# Patient Record
Sex: FEMALE | Race: WHITE | NOT HISPANIC OR LATINO | Employment: FULL TIME | URBAN - METROPOLITAN AREA
[De-identification: names, ages, dates, MRNs, and addresses within clinical notes are randomized per-mention and may not be internally consistent; named-entity substitution may affect disease eponyms.]

---

## 2017-04-26 ENCOUNTER — OFFICE VISIT (OUTPATIENT)
Dept: RETAIL CLINIC | Facility: CLINIC | Age: 37
End: 2017-04-26

## 2017-04-26 VITALS
SYSTOLIC BLOOD PRESSURE: 135 MMHG | OXYGEN SATURATION: 96 % | WEIGHT: 225 LBS | HEIGHT: 63 IN | HEART RATE: 86 BPM | BODY MASS INDEX: 39.87 KG/M2 | TEMPERATURE: 98.2 F | DIASTOLIC BLOOD PRESSURE: 75 MMHG

## 2017-04-26 DIAGNOSIS — N30.00 ACUTE CYSTITIS WITHOUT HEMATURIA: ICD-10-CM

## 2017-04-26 DIAGNOSIS — R35.0 FREQUENT URINATION: Primary | ICD-10-CM

## 2017-04-26 DIAGNOSIS — J30.2 SEASONAL ALLERGIC RHINITIS, UNSPECIFIED ALLERGIC RHINITIS TRIGGER: ICD-10-CM

## 2017-04-26 LAB
BILIRUB BLD-MCNC: NEGATIVE MG/DL
CLARITY, POC: CLEAR
COLOR UR: ABNORMAL
GLUCOSE UR STRIP-MCNC: ABNORMAL MG/DL
KETONES UR QL: NEGATIVE
LEUKOCYTE EST, POC: NEGATIVE
NITRITE UR-MCNC: POSITIVE MG/ML
PH UR: 5.5 [PH] (ref 5–8)
PROT UR STRIP-MCNC: NEGATIVE MG/DL
RBC # UR STRIP: ABNORMAL /UL
SP GR UR: 1 (ref 1–1.03)
UROBILINOGEN UR QL: NORMAL

## 2017-04-26 PROCEDURE — 81003 URINALYSIS AUTO W/O SCOPE: CPT | Performed by: NURSE PRACTITIONER

## 2017-04-26 PROCEDURE — 99214 OFFICE O/P EST MOD 30 MIN: CPT | Performed by: NURSE PRACTITIONER

## 2017-04-26 RX ORDER — FLUTICASONE PROPIONATE 50 MCG
2 SPRAY, SUSPENSION (ML) NASAL DAILY
Qty: 1 EACH | Refills: 0 | Status: SHIPPED | OUTPATIENT
Start: 2017-04-26 | End: 2018-02-23 | Stop reason: SDUPTHER

## 2017-04-26 RX ORDER — NITROFURANTOIN 25; 75 MG/1; MG/1
100 CAPSULE ORAL 2 TIMES DAILY
Qty: 10 CAPSULE | Refills: 0 | Status: SHIPPED | OUTPATIENT
Start: 2017-04-26 | End: 2017-05-01

## 2017-04-26 NOTE — PROGRESS NOTES
Dominic Hawk is a 37 y.o. female.     Urinary Tract Infection      Urinary Tract Infection    This is a new problem. The current episode started yesterday. The problem occurs every urination. The problem has been unchanged. The pain is mild. There has been no fever. She is sexually active. There is no history of pyelonephritis. Associated symptoms include flank pain (mild), frequency, hesitancy and urgency. Pertinent negatives include no chills, discharge, hematuria, nausea, possible pregnancy or vomiting. Treatments tried: Azo. The treatment provided mild relief. There is no history of kidney stones, recurrent UTIs or a single kidney.   Ear Fullness    There is pain in both ears. This is a recurrent problem. The current episode started in the past 7 days. The problem occurs constantly. The problem has been unchanged. There has been no fever. The pain is mild. Associated symptoms include rhinorrhea. Pertinent negatives include no abdominal pain, coughing, ear discharge, headaches, hearing loss, rash, sore throat or vomiting. She has tried nothing for the symptoms. There is no history of a chronic ear infection or a tympanostomy tube.        The following portions of the patient's history were reviewed and updated as appropriate: allergies, current medications, past family history, past medical history, past social history, past surgical history and problem list.       Review of Systems   Constitutional: Positive for fatigue. Negative for chills and fever.   HENT: Positive for congestion, ear pain (moderate, ear fullness), postnasal drip and rhinorrhea. Negative for ear discharge, hearing loss, sinus pressure, sneezing, sore throat and trouble swallowing.    Eyes: Negative.    Respiratory: Negative.  Negative for cough.    Cardiovascular: Negative.    Gastrointestinal: Negative.  Negative for abdominal pain, nausea and vomiting.   Genitourinary: Positive for decreased urine volume, flank pain (mild),  "frequency, hesitancy and urgency. Negative for hematuria.   Skin: Negative for rash.   Allergic/Immunologic: Positive for environmental allergies.   Neurological: Negative for headaches.   Hematological: Positive for adenopathy.       Objective   Physical Exam   Constitutional: She appears well-developed and well-nourished.   HENT:   Head: Normocephalic and atraumatic.   Right Ear: Hearing and ear canal normal. Tympanic membrane is not erythematous. A middle ear effusion is present.   Left Ear: Hearing and ear canal normal. Tympanic membrane is not erythematous. A middle ear effusion is present.   Nose: Mucosal edema and rhinorrhea present. Right sinus exhibits no maxillary sinus tenderness and no frontal sinus tenderness. Left sinus exhibits no maxillary sinus tenderness and no frontal sinus tenderness.   Mouth/Throat: Uvula is midline. Posterior oropharyngeal erythema present. No posterior oropharyngeal edema. Tonsils are 0 on the right. Tonsils are 0 on the left. No tonsillar exudate.   Eyes: Conjunctivae are normal.   Neck: Neck supple.   Cardiovascular: Normal rate, regular rhythm and normal heart sounds.    Pulmonary/Chest: Effort normal and breath sounds normal. No respiratory distress. She has no wheezes. She has no rales.   Abdominal: Soft. Bowel sounds are normal. She exhibits no distension. There is tenderness in the suprapubic area. There is CVA tenderness (mild; patient reports she also hurt her back recently and regularly sees chiropractor).   Lymphadenopathy:     She has no cervical adenopathy.   Skin: Skin is warm and dry.   Psychiatric: She has a normal mood and affect. Her behavior is normal.   Vitals reviewed.      /75  Pulse 86  Temp 98.2 °F (36.8 °C)  Ht 63\" (160 cm)  Wt 225 lb (102 kg)  SpO2 96%  BMI 39.86 kg/m2    Assessment/Plan   Alicia was seen today for urinary tract infection.    Diagnoses and all orders for this visit:    Frequent urination  -     POCT urinalysis dipstick, " automated    Acute cystitis without hematuria  -     nitrofurantoin, macrocrystal-monohydrate, (MACROBID) 100 MG capsule; Take 1 capsule by mouth 2 (Two) Times a Day for 5 days.    Seasonal allergic rhinitis, unspecified allergic rhinitis trigger  -     fluticasone (FLONASE) 50 MCG/ACT nasal spray; 2 sprays into each nostril Daily.  -     loratadine-pseudoephedrine (CLARITIN-D 12 HOUR) 5-120 MG per 12 hr tablet; Take 1 tablet by mouth 2 (Two) Times a Day for 30 days.      Brief Urine Lab Results  (Last result in the past 365 days)      Color   Clarity   Blood   Leuk Est   Nitrite   Protein   CREAT   Urine HCG        04/26/17 1118 Saline(A) Clear Trace(A) Negative Positive(A) Negative             Encouraged to increase fluids. May continue cranberry juice.    Instructed to f/u with PCP if urinary symptoms do not improve after course of Macrobid.     Plan of care reviewed with patient at the conclusion of today's visit. Patient education was provided in regards to diagnosis and prescribed medications, including signs of adverse reactions. Encouraged patient to increase fluids, rest, and other comfort measures. Instructed patient to follow up with PCP, go to Urgent Care Center, or Emergency Room if symptoms worsen. Patient verbalized understanding.     Michael Love, APRN

## 2017-04-26 NOTE — PATIENT INSTRUCTIONS
Loratadine; Pseudoephedrine tablets  What is this medicine?  LORATADINE; PSEUDOEPHEDRINE (scott AT a alla; gerry chahal e FED rin) is a combination of an antihistamine and a decongestant. This medicine is used to treat the symptoms of allergies. It reduces congestion, sneezing, runny nose, and itching.  This medicine may be used for other purposes; ask your health care provider or pharmacist if you have questions.  COMMON BRAND NAME(S): Alavert-D, Allergy and Sinus Relief, Claritin-D, Claritin-D 12 Hour, Claritin-D 24 Hour, Clear-Atadine D  What should I tell my health care provider before I take this medicine?  They need to know if you have any of these conditions:  -diabetes  -difficulty urinating  -heart disease  -high blood pressure  -kidney disease  -liver disease  -thyroid disease  -an unusual or allergic reaction to loratadine, pseudoephedrine, other medicines, foods, dyes, or preservatives  -pregnant or trying to get pregnant  -breast-feeding  How should I use this medicine?  Take this medicine by mouth with a full glass of water. Follow the directions on the label. You can take it with or without food. Do not cut, crush or chew this medicine. Take your medicine at regular intervals. Do not take your medicine more often than directed.  Talk to your pediatrician regarding the use of this medicine in children. While this medicine may be used in children as young as 12 years for selected conditions, precautions do apply.  Overdosage: If you think you have taken too much of this medicine contact a poison control center or emergency room at once.  NOTE: This medicine is only for you. Do not share this medicine with others.  What if I miss a dose?  If you miss a dose, take it as soon as you can. If it is almost time for your next dose, take only that dose. Do not take double or extra doses.  What may interact with this medicine?  Do not take this medicine with any of the following medications:  -bromocriptine  -ergot  alkaloids like dihydroergotamine, ergonovine, ergotamine, methylergonovine  -MAOIs like Carbex, Eldepryl, Marplan, Nardil, and Parnate  -medicines for allergies, colds, breathing difficulties  -procarbazine  -stimulant medicines for attention disorders, weight loss, or to stay awake  This medicine may also interact with the following medications:  -digoxin  -efavirenz  -linezolid  -medicines for depression like amitriptyline, nortriptyline  -medicines for heart disease or blood pressure like atenolol, clonidine, doxazosin, mecamylamine, methyldopa, reserpine  -Justin's wort  -theophylline  -tizanidine  -yohimbine  This list may not describe all possible interactions. Give your health care provider a list of all the medicines, herbs, non-prescription drugs, or dietary supplements you use. Also tell them if you smoke, drink alcohol, or use illegal drugs. Some items may interact with your medicine.  What should I watch for while using this medicine?  Tell your doctor or healthcare professional if your symptoms do not start to get better, if they get worse or if you have a high fever. If you have high blood pressure, check your blood pressure regularly. Ask your health care professional what your blood pressure should be, and when you should contact him or her.  Your mouth may get dry. Chewing sugarless gum or sucking hard candy, and drinking plenty of water may help. Contact your doctor if the problem does not go away or is severe.  What side effects may I notice from receiving this medicine?  Side effects that you should report to your doctor or health care professional as soon as possible:  -allergic reactions like skin rash or hives, swelling of the face, lips, or tongue  -breathing trouble  -difficulty urinating  -difficulty sleeping  -dizzy, faint  -fast, irregular heartbeat  -high blood pressure  -nervousness, agitation  -tremor, muscle contractions  -unusually weak or tired  Side effects that usually do not  require medical attention (report to your doctor or health care professional if they continue or are bothersome):  -cough  -dry mouth  -headache  -loss of appetite  -nausea  This list may not describe all possible side effects. Call your doctor for medical advice about side effects. You may report side effects to FDA at 6-438-FDA-3448.  Where should I keep my medicine?  Keep out of the reach of children.  Store at room temperature between 15 and 25 degrees C (59 and 77 degrees F). Protect from moisture and light. Throw away any unused medicine after the expiration date.  NOTE: This sheet is a summary. It may not cover all possible information. If you have questions about this medicine, talk to your doctor, pharmacist, or health care provider.     © 2017, [employer]. (2009-06-22 17:19:09)  Fluticasone nasal spray  What is this medicine?  FLUTICASONE (floo TIK a sone) is a corticosteroid. This medicine is used to treat the symptoms of allergies like sneezing, itchy red eyes, and itchy, runny, or stuffy nose.  This medicine may be used for other purposes; ask your health care provider or pharmacist if you have questions.  COMMON BRAND NAME(S): Flonase, Flonase Allergy Relief, Flonase Sensimist, Veramyst  What should I tell my health care provider before I take this medicine?  They need to know if you have any of these conditions:  -infection, like tuberculosis, herpes, or fungal infection  -recent surgery on nose or sinuses  -taking corticosteroid by mouth  -an unusual or allergic reaction to fluticasone, steroids, other medicines, foods, dyes, or preservatives  -pregnant or trying to get pregnant  -breast-feeding  How should I use this medicine?  This medicine is for use in the nose. Follow the directions on your product or prescription label. This medicine works best if used at regular intervals. Do not use more often than directed. Make sure that you are using your nasal spray correctly. After 6 months  of daily use without a prescription, talk to your doctor or health care professional before using it for a longer time. Ask your doctor or health care professional if you have any questions.  Talk to your pediatrician regarding the use of this medicine in children. Special care may be needed. This medicine has been used in children as young as 2 years. After two months of daily use without a prescription in a child, talk to your pediatrician before using it for a longer time.  Overdosage: If you think you have taken too much of this medicine contact a poison control center or emergency room at once.  NOTE: This medicine is only for you. Do not share this medicine with others.  What if I miss a dose?  If you miss a dose, use it as soon as you remember. If it is almost time for your next dose, use only that dose and continue with your regular schedule. Do not use double or extra doses.  What may interact with this medicine?  -ketoconazole  -metyrapone  -some medicines for HIV  -vaccines  This list may not describe all possible interactions. Give your health care provider a list of all the medicines, herbs, non-prescription drugs, or dietary supplements you use. Also tell them if you smoke, drink alcohol, or use illegal drugs. Some items may interact with your medicine.  What should I watch for while using this medicine?  Visit your doctor or health care professional for regular checks on your progress. Some symptoms may improve within 12 hours after starting use. Check with your doctor or health care professional if there is no improvement in your condition after 3 weeks of use.  Do not come in contact with people who have chickenpox or the measles while you are taking this medicine. If you do, call your doctor right away.  What side effects may I notice from receiving this medicine?  Side effects that you should report to your doctor or health care professional as soon as possible:  -allergic reactions like skin  rash, itching or hives, swelling of the face, lips, or tongue  -changes in vision  -flu-like symptoms  -white patches or sores in the mouth or nose  Side effects that usually do not require medical attention (report to your doctor or health care professional if they continue or are bothersome):  -burning or irritation inside the nose or throat  -cough  -headache  -nosebleed  -unusual taste or smell  This list may not describe all possible side effects. Call your doctor for medical advice about side effects. You may report side effects to FDA at 2-988-FDA-6356.  Where should I keep my medicine?  Keep out of the reach of children.  Store at room temperature between 15 and 30 degrees C (59 and 86 degrees F). Throw away any unused medicine after the expiration date.  NOTE: This sheet is a summary. It may not cover all possible information. If you have questions about this medicine, talk to your doctor, pharmacist, or health care provider.     © 2017, [employer]. (2015-05-13 09:07:53)  Nitrofurantoin tablets or capsules  What is this medicine?  NITROFURANTOIN (cristopher jannie mora AN toyn) is an antibiotic. It is used to treat urinary tract infections.  This medicine may be used for other purposes; ask your health care provider or pharmacist if you have questions.  COMMON BRAND NAME(S): Macrobid, Macrodantin, Urotoin  What should I tell my health care provider before I take this medicine?  They need to know if you have any of these conditions:  -anemia  -diabetes  -glucose-6-phosphate dehydrogenase deficiency  -kidney disease  -liver disease  -lung disease  -other chronic illness  -an unusual or allergic reaction to nitrofurantoin, other antibiotics, other medicines, foods, dyes or preservatives  -pregnant or trying to get pregnant  -breast-feeding  How should I use this medicine?  Take this medicine by mouth with a glass of water. Follow the directions on the prescription label. Take this medicine with food or  milk. Take your doses at regular intervals. Do not take your medicine more often than directed. Do not stop taking except on your doctor's advice.  Talk to your pediatrician regarding the use of this medicine in children. While this drug may be prescribed for selected conditions, precautions do apply.  Overdosage: If you think you have taken too much of this medicine contact a poison control center or emergency room at once.  NOTE: This medicine is only for you. Do not share this medicine with others.  What if I miss a dose?  If you miss a dose, take it as soon as you can. If it is almost time for your next dose, take only that dose. Do not take double or extra doses.  What may interact with this medicine?  -antacids containing magnesium trisilicate  -probenecid  -quinolone antibiotics like ciprofloxacin, lomefloxacin, norfloxacin and ofloxacin  -sulfinpyrazone  This list may not describe all possible interactions. Give your health care provider a list of all the medicines, herbs, non-prescription drugs, or dietary supplements you use. Also tell them if you smoke, drink alcohol, or use illegal drugs. Some items may interact with your medicine.  What should I watch for while using this medicine?  Tell your doctor or health care professional if your symptoms do not improve or if you get new symptoms. Drink several glasses of water a day. If you are taking this medicine for a long time, visit your doctor for regular checks on your progress.  If you are diabetic, you may get a false positive result for sugar in your urine with certain brands of urine tests. Check with your doctor.  What side effects may I notice from receiving this medicine?  Side effects that you should report to your doctor or health care professional as soon as possible:  -allergic reactions like skin rash or hives, swelling of the face, lips, or tongue  -chest pain  -cough  -difficulty breathing  -dizziness, drowsiness  -fever or infection  -joint  aches or pains  -pale or blue-tinted skin  -redness, blistering, peeling or loosening of the skin, including inside the mouth  -tingling, burning, pain, or numbness in hands or feet  -unusual bleeding or bruising  -unusually weak or tired  -yellowing of eyes or skin  Side effects that usually do not require medical attention (report to your doctor or health care professional if they continue or are bothersome):  -dark urine  -diarrhea  -headache  -loss of appetite  -nausea or vomiting  -temporary hair loss  This list may not describe all possible side effects. Call your doctor for medical advice about side effects. You may report side effects to FDA at 8-355-DNF-3674.  Where should I keep my medicine?  Keep out of the reach of children.  Store at room temperature between 15 and 30 degrees C (59 and 86 degrees F). Protect from light. Throw away any unused medicine after the expiration date.  NOTE: This sheet is a summary. It may not cover all possible information. If you have questions about this medicine, talk to your doctor, pharmacist, or health care provider.     © 2017, [employer]. (2009-07-08 15:56:47)  Urinary Tract Infection, Adult  A urinary tract infection (UTI) is an infection of any part of the urinary tract, which includes the kidneys, ureters, bladder, and urethra. These organs make, store, and get rid of urine in the body. UTI can be a bladder infection (cystitis) or kidney infection (pyelonephritis).  CAUSES  This infection may be caused by fungi, viruses, or bacteria. Bacteria are the most common cause of UTIs. This condition can also be caused by repeated incomplete emptying of the bladder during urination.   RISK FACTORS  This condition is more likely to develop if:   · You ignore your need to urinate or hold urine for long periods of time.  · You do not empty your bladder completely during urination.  · You wipe back to front after urinating or having a bowel movement, if you are  female.  · You are uncircumcised, if you are male.    · You are constipated.  · You have a urinary catheter that stays in place (indwelling).  · You have a weak defense (immune) system.  · You have a medical condition that affects your bowels, kidneys, or bladder.  · You have diabetes.  · You take antibiotic medicines frequently or for long periods of time, and the antibiotics no longer work well against certain types of infections (antibiotic resistance).  · You take medicines that irritate your urinary tract.  · You are exposed to chemicals that irritate your urinary tract.  · You are female.  SYMPTOMS   Symptoms of this condition include:   · Fever.    · Frequent urination or passing small amounts of urine frequently.    · Needing to urinate urgently.    · Pain or burning with urination.    · Urine that smells bad or unusual.    · Cloudy urine.    · Pain in the lower abdomen or back.    · Trouble urinating.    · Blood in the urine.    · Vomiting or being less hungry than normal.     · Diarrhea or abdominal pain.    · Vaginal discharge, if you are female.  DIAGNOSIS  This condition is diagnosed with a medical history and physical exam. You will also need to provide a urine sample to test your urine. Other tests may be done, including:    · Blood tests.    · Sexually transmitted disease (STD) testing.     If you have had more than one UTI, a cystoscopy or imaging studies may be done to determine the cause of the infections.   TREATMENT   Treatment for this condition often includes a combination of two or more of the following:   · Antibiotic medicine.    · Other medicines to treat less common causes of UTI.    · Over-the-counter medicines to treat pain.    · Drinking enough water to stay hydrated.  HOME CARE INSTRUCTIONS  · Take over-the-counter and prescription medicines only as told by your health care provider.  · If you were prescribed an antibiotic, take it as told by your health care provider. Do not stop  taking the antibiotic even if you start to feel better.  · Avoid alcohol, caffeine, tea, and carbonated beverages. They can irritate your bladder.  · Drink enough fluid to keep your urine clear or pale yellow.  · Keep all follow-up visits as told by your health care provider. This is important.  · Make sure to:    Empty your bladder often and completely. Do not hold urine for long periods of time.    Empty your bladder before and after sex.    Wipe from front to back after a bowel movement if you are female. Use each tissue one time when you wipe.  SEEK MEDICAL CARE IF:   · You have back pain.    · You have a fever.  · You feel nauseous or vomit.    · Your symptoms do not get better after 3 days.     · Your symptoms go away and then return.  SEEK IMMEDIATE MEDICAL CARE IF:   · You have severe back pain or lower abdominal pain.    · You are vomiting and cannot keep down any medicines or water.     This information is not intended to replace advice given to you by your health care provider. Make sure you discuss any questions you have with your health care provider.     Document Released: 09/27/2006 Document Revised: 01/13/2017 Document Reviewed: 11/07/2016  [employer] Patient Education ©2016 [employer]

## 2017-11-16 ENCOUNTER — OFFICE VISIT (OUTPATIENT)
Dept: RETAIL CLINIC | Facility: CLINIC | Age: 37
End: 2017-11-16

## 2017-11-16 VITALS — OXYGEN SATURATION: 99 % | HEART RATE: 87 BPM | TEMPERATURE: 98.3 F | RESPIRATION RATE: 16 BRPM

## 2017-11-16 DIAGNOSIS — J06.9 ACUTE URI: Primary | ICD-10-CM

## 2017-11-16 PROCEDURE — 99213 OFFICE O/P EST LOW 20 MIN: CPT | Performed by: NURSE PRACTITIONER

## 2017-11-16 RX ORDER — BENZONATATE 200 MG/1
200 CAPSULE ORAL 3 TIMES DAILY PRN
Qty: 21 CAPSULE | Refills: 0 | Status: SHIPPED | OUTPATIENT
Start: 2017-11-16 | End: 2018-07-02

## 2017-11-16 NOTE — PROGRESS NOTES
Alicia Hawk is 37 y.o. female      Cough   This is a new problem. The current episode started yesterday. The problem has been unchanged. The problem occurs every few hours. The cough is non-productive. Associated symptoms include chills, ear congestion, nasal congestion and a sore throat. Pertinent negatives include no chest pain, ear pain, fever, headaches, heartburn, hemoptysis, myalgias, postnasal drip, rash, rhinorrhea, shortness of breath, sweats, weight loss or wheezing. The symptoms are aggravated by lying down. Risk factors for lung disease include smoking/tobacco exposure. She has tried OTC cough suppressant for the symptoms. The treatment provided mild relief. Her past medical history is significant for bronchitis. There is no history of asthma, bronchiectasis, COPD, emphysema, environmental allergies or pneumonia. boyfriend has same symptoms   Sore Throat    This is a new problem. The current episode started yesterday. The problem has been unchanged. Neither side of throat is experiencing more pain than the other. There has been no fever. The pain is mild. Associated symptoms include congestion, coughing and drooling. Pertinent negatives include no abdominal pain, diarrhea, ear discharge, ear pain, headaches, hoarse voice, plugged ear sensation, neck pain, shortness of breath, stridor, swollen glands, trouble swallowing or vomiting. She has had no exposure to mono.             Current Outpatient Prescriptions:   •  azithromycin (ZITHROMAX Z-CICI) 250 MG tablet, Take 2 tablets the first day, then 1 tablet daily for 4 days., Disp: 6 tablet, Rfl: 0  •  benzonatate (TESSALON) 200 MG capsule, Take 1 capsule by mouth 3 (Three) Times a Day As Needed for Cough., Disp: 21 capsule, Rfl: 0  •  fluticasone (FLONASE) 50 MCG/ACT nasal spray, 2 sprays into each nostril Daily., Disp: 1 each, Rfl: 0  •  PredniSONE 5 MG (21) tablet therapy pack dosepak, Take 1 tablet by mouth Take As Directed. Take as directed on package  instructions., Disp: 1 each, Rfl: 0        Allergies   Allergen Reactions   • Penicillins Hives           History reviewed. No pertinent past medical history.        Past Surgical History:   Procedure Laterality Date   • TONSILLECTOMY             History reviewed. No pertinent family history.        Social History     Social History   • Marital status: Unknown     Spouse name: N/A   • Number of children: N/A   • Years of education: N/A     Occupational History   • Not on file.     Social History Main Topics   • Smoking status: Heavy Tobacco Smoker     Packs/day: 1.00     Years: 15.00   • Smokeless tobacco: Not on file   • Alcohol use Yes      Comment: occasionally   • Drug use: No   • Sexual activity: Yes     Birth control/ protection: None     Other Topics Concern   • Not on file     Social History Narrative           Review of Systems   Constitutional: Positive for chills. Negative for activity change, appetite change, fatigue, fever and weight loss.   HENT: Positive for congestion, drooling, sinus pressure and sore throat. Negative for ear discharge, ear pain, hoarse voice, mouth sores, postnasal drip, rhinorrhea, sneezing, trouble swallowing and voice change.    Eyes: Negative.    Respiratory: Positive for cough. Negative for hemoptysis, shortness of breath, wheezing and stridor.    Cardiovascular: Negative.  Negative for chest pain.   Gastrointestinal: Negative.  Negative for abdominal pain, diarrhea, heartburn and vomiting.   Musculoskeletal: Negative for myalgias and neck pain.   Skin: Negative.  Negative for rash.   Allergic/Immunologic: Negative.  Negative for environmental allergies.   Neurological: Negative for headaches.           Physical Exam   Constitutional: She is oriented to person, place, and time. She appears well-developed and well-nourished. She is cooperative.  Non-toxic appearance. She does not have a sickly appearance. She does not appear ill. No distress.   HENT:   Head: Normocephalic and  atraumatic.   Right Ear: Tympanic membrane and external ear normal.   Left Ear: Tympanic membrane and external ear normal.   Nose: Nose normal. Right sinus exhibits no maxillary sinus tenderness and no frontal sinus tenderness. Left sinus exhibits no maxillary sinus tenderness and no frontal sinus tenderness.   Mouth/Throat: Uvula is midline, oropharynx is clear and moist and mucous membranes are normal. No oropharyngeal exudate. No tonsillar exudate.   Eyes: Conjunctivae are normal.   Neck: Neck supple.   Cardiovascular: Normal rate, regular rhythm and normal heart sounds.  Exam reveals no gallop and no friction rub.    No murmur heard.  Pulmonary/Chest: Effort normal and breath sounds normal. No respiratory distress. She has no decreased breath sounds. She has no wheezes. She has no rhonchi. She has no rales. She exhibits no tenderness.   Lymphadenopathy:     She has no cervical adenopathy.   Neurological: She is alert and oriented to person, place, and time.   Skin: Skin is warm and dry.   Psychiatric: She has a normal mood and affect. Her behavior is normal.           Alicia was seen today for cough and sore throat.    Diagnoses and all orders for this visit:    Acute URI    Other orders  -     benzonatate (TESSALON) 200 MG capsule; Take 1 capsule by mouth 3 (Three) Times a Day As Needed for Cough.          Education instructions given to patient per AVS diagnosis. Patient verbalizes understanding of instructions.

## 2017-11-16 NOTE — PATIENT INSTRUCTIONS
17-Hydroxycorticosteroids Test  WHY AM I HAVING THIS TEST?  This is a urine test that determines the level of steroids in the urine. Testing your urine is an indirect way for your health care provider to assess how your adrenal glands are working.  WHAT KIND OF SAMPLE IS TAKEN?  A urine sample is collected in a sterile container that is given to you by the lab to use at home before the test.  WILL I NEED TO COLLECT SAMPLES AT HOME?  You will be asked to collect urine samples at home over a 24-hour time period. Follow your health care provider's instructions about how to collect the samples.  HOW DO I PREPARE FOR THE TEST?  Your health care provider may ask you to avoid the following things before and during the sample collection period:  · Situations that cause you stress.  · Eating licorice.  · Taking certain medicines.  When collecting urine samples over a 24-hour period, make sure you:  · Collect urine only in sterile containers provided to you by the lab.  · Refrigerate all samples until they can be returned to the lab.  · Do not contaminate samples with your stool. Urinate before having a bowel movement to avoid contamination.  · Do not put toilet paper in the urine collection container.  WHAT ARE THE REFERENCE RANGES?  Reference ranges are considered healthy ranges established after testing a large group of healthy people. Reference ranges may vary among different people, labs, and hospitals. It is your responsibility to obtain your test results. Ask the lab or department performing the test when and how you will receive your results.  · Adults:    Male: 3-10 mg in 24 hours or 8.3-27.6 micromoles in a day (SI units).    Female: 2-8 mg/24 hours or 5.2-22.1 micromoles in a day (SI units).    Elderly: slightly lower than adult values.  · Children:    Less than 8 years of age: less than 1.5 mg in 24 hours.    8-12 years of age: less than 4.5 mg in 24 hours.  WHAT DO THE RESULTS MEAN?  Increased levels may be  related to the following:  · Cushing disease or Cushing syndrome.  · Tumors of the pituitary gland.  · Hyperthyroidism.  · Stress.  · Obesity.  Decreased levels may be related to the following:  · Certain syndromes of the adrenal gland.  · Underactive pituitary gland.  · Pickett disease.  · Hypothyroidism.  Talk with your health care provider to discuss your test results, treatment options, and if necessary, the need for more tests. Talk with your health care provider if you have any questions about your results.     This information is not intended to replace advice given to you by your health care provider. Make sure you discuss any questions you have with your health care provider.     Document Released: 01/20/2006 Document Revised: 01/08/2016 Document Reviewed: 05/04/2015  [email or web address] Interactive Patient Education ©2017 [employer]

## 2017-11-26 ENCOUNTER — OFFICE VISIT (OUTPATIENT)
Dept: RETAIL CLINIC | Facility: CLINIC | Age: 37
End: 2017-11-26

## 2017-11-26 VITALS
TEMPERATURE: 97.8 F | OXYGEN SATURATION: 98 % | WEIGHT: 228 LBS | HEIGHT: 63 IN | HEART RATE: 76 BPM | BODY MASS INDEX: 40.4 KG/M2

## 2017-11-26 DIAGNOSIS — R05.9 COUGH: ICD-10-CM

## 2017-11-26 DIAGNOSIS — J01.00 ACUTE MAXILLARY SINUSITIS, RECURRENCE NOT SPECIFIED: Primary | ICD-10-CM

## 2017-11-26 PROCEDURE — 99213 OFFICE O/P EST LOW 20 MIN: CPT | Performed by: NURSE PRACTITIONER

## 2017-11-26 RX ORDER — ALBUTEROL SULFATE 90 UG/1
2 AEROSOL, METERED RESPIRATORY (INHALATION) EVERY 4 HOURS PRN
Qty: 1 INHALER | Refills: 0 | Status: SHIPPED | OUTPATIENT
Start: 2017-11-26 | End: 2018-02-23 | Stop reason: SDUPTHER

## 2017-11-26 RX ORDER — DOXYCYCLINE HYCLATE 100 MG/1
100 CAPSULE ORAL 2 TIMES DAILY
Qty: 10 CAPSULE | Refills: 0 | Status: SHIPPED | OUTPATIENT
Start: 2017-11-26 | End: 2017-11-26 | Stop reason: SDUPTHER

## 2017-11-26 RX ORDER — DEXTROMETHORPHAN HYDROBROMIDE AND PROMETHAZINE HYDROCHLORIDE 15; 6.25 MG/5ML; MG/5ML
5 SYRUP ORAL NIGHTLY PRN
Qty: 180 ML | Refills: 0 | Status: SHIPPED | OUTPATIENT
Start: 2017-11-26 | End: 2017-11-26 | Stop reason: SDUPTHER

## 2017-11-26 RX ORDER — ALBUTEROL SULFATE 90 UG/1
2 AEROSOL, METERED RESPIRATORY (INHALATION) EVERY 4 HOURS PRN
Qty: 1 INHALER | Refills: 0 | Status: SHIPPED | OUTPATIENT
Start: 2017-11-26 | End: 2017-11-26 | Stop reason: SDUPTHER

## 2017-11-26 RX ORDER — DOXYCYCLINE HYCLATE 100 MG/1
100 CAPSULE ORAL 2 TIMES DAILY
Qty: 10 CAPSULE | Refills: 0 | Status: SHIPPED | OUTPATIENT
Start: 2017-11-26 | End: 2017-12-01

## 2017-11-26 RX ORDER — DEXTROMETHORPHAN HYDROBROMIDE AND PROMETHAZINE HYDROCHLORIDE 15; 6.25 MG/5ML; MG/5ML
5 SYRUP ORAL NIGHTLY PRN
Qty: 180 ML | Refills: 0 | Status: SHIPPED | OUTPATIENT
Start: 2017-11-26 | End: 2018-02-23 | Stop reason: SDUPTHER

## 2017-11-26 NOTE — PROGRESS NOTES
Subjective   Sinus Problem        History of Present Illness   Alicia Hawk is a 37 y.o. female who complains of sinus congestion and cough. Patient states nasal secretions yellow/greeen, thick. Cough worse at night, interrupts sleep. No definite fevers. Has intermittent wheezes, used a friend's inhaler on occasion. Patient was diagnosed with URI on 11/16/17, symptoms now worse. She has been taking Mucinex, Flonase and tessalon pearle for her symptoms without improvement.     History Obtained from: Patient    History reviewed. No pertinent past medical history.  Past Surgical History:   Procedure Laterality Date   • TONSILLECTOMY       Social History     Social History   • Marital status: Unknown     Spouse name: N/A   • Number of children: N/A   • Years of education: N/A     Occupational History   • Not on file.     Social History Main Topics   • Smoking status: Current Every Day Smoker     Packs/day: 0.50     Years: 15.00   • Smokeless tobacco: Never Used   • Alcohol use Yes      Comment: occasionally   • Drug use: No   • Sexual activity: Defer     Other Topics Concern   • Not on file     Social History Narrative     History reviewed. No pertinent family history.  Allergies   Allergen Reactions   • Penicillins Hives     Current Outpatient Prescriptions   Medication Sig Dispense Refill   • fluticasone (FLONASE) 50 MCG/ACT nasal spray 2 sprays into each nostril Daily. 1 each 0   • albuterol (PROVENTIL HFA;VENTOLIN HFA) 108 (90 Base) MCG/ACT inhaler Inhale 2 puffs Every 4 (Four) Hours As Needed for Wheezing or Shortness of Air. 1 inhaler 0   • benzonatate (TESSALON) 200 MG capsule Take 1 capsule by mouth 3 (Three) Times a Day As Needed for Cough. 21 capsule 0   • doxycycline (VIBRAMYCIN) 100 MG capsule Take 1 capsule by mouth 2 (Two) Times a Day for 5 days. 10 capsule 0   • promethazine-dextromethorphan (PROMETHAZINE-DM) 6.25-15 MG/5ML syrup Take 5 mL by mouth At Night As Needed for Cough (at bedtime as needed). 180  "mL 0     No current facility-administered medications for this visit.         The following portions of the patient's history were reviewed and updated as appropriate: allergies, current medications, past family history, past medical history, past social history and past surgical history.    Review of Systems   Constitutional: Positive for fatigue. Negative for chills and fever.   HENT: Positive for congestion, postnasal drip, sinus pain and sinus pressure. Negative for ear pain and trouble swallowing.    Eyes: Negative.    Respiratory: Positive for cough, chest tightness, shortness of breath and wheezing.    Cardiovascular: Negative for chest pain and palpitations.   Gastrointestinal: Negative for abdominal distention, nausea and vomiting.   Musculoskeletal: Positive for neck stiffness. Negative for neck pain.   Skin: Negative for rash.   Neurological: Positive for headaches. Negative for dizziness.   Hematological: Negative for adenopathy.   Psychiatric/Behavioral: Negative.        Objective     VITAL SIGNS:   Vitals:    11/26/17 1409   Pulse: 76   Temp: 97.8 °F (36.6 °C)   SpO2: 98%   Weight: 228 lb (103 kg)   Height: 63\" (160 cm)   Body mass index is 40.39 kg/(m^2).    Physical Exam   Constitutional: She appears well-developed and well-nourished. No distress.   HENT:   Head: Normocephalic and atraumatic.   Right Ear: External ear and ear canal normal. Tympanic membrane is injected.   Left Ear: External ear and ear canal normal. Tympanic membrane is injected.   Nose: Right sinus exhibits maxillary sinus tenderness. Left sinus exhibits maxillary sinus tenderness.   Mouth/Throat: Oropharynx is clear and moist.   Eyes: Conjunctivae and EOM are normal. Pupils are equal, round, and reactive to light. No scleral icterus.   Neck: Neck supple. No tracheal deviation present.   Cardiovascular: Normal rate and regular rhythm.    No murmur heard.  Pulmonary/Chest: Effort normal. She has no wheezes. She has no rales. She " exhibits no tenderness.   Musculoskeletal: She exhibits no deformity.   Lymphadenopathy:     She has no cervical adenopathy.        Right: No supraclavicular adenopathy present.        Left: No supraclavicular adenopathy present.   Neurological: She is alert.   Skin: Skin is warm and dry. No pallor.   Psychiatric: Her behavior is normal.           CLINICAL QUALITY MEASURES:  Tobacco Screening & Intervention Tobacco user, received tobacco cessation counseling. Current every day smoker less than 3 minutes spent counseling currently quitting, graducally weaning nicotine intake   WEIGHT SCREENING/BMI  Not eligible, overweight & managed by other physician     Assessment/Plan     Alicia was seen today for sinus problem.    Diagnoses and all orders for this visit:    Acute maxillary sinusitis, recurrence not specified  Cough  -     doxycycline (VIBRAMYCIN) 100 MG capsule; Take 1 capsule by mouth 2 (Two) Times a Day for 5 days.  -     albuterol (PROVENTIL HFA;VENTOLIN HFA) 108 (90 Base) MCG/ACT inhaler; Inhale 2 puffs Every 4 (Four) Hours As Needed for Wheezing or Shortness of Air.  -     promethazine-dextromethorphan (PROMETHAZINE-DM) 6.25-15 MG/5ML syrup; Take 5 mL by mouth At Night As Needed for Cough (at bedtime as needed).      PLAN:  Patient should follow up with primary care provider if symptoms fail to improve, worsen or for the development of new symptoms that need attention.    The patientvoiced understanding and agreement to the patient treatment plan and instructions     JACQUES Lundberg

## 2017-11-26 NOTE — PATIENT INSTRUCTIONS
Sinusitis, Adult  Sinusitis is soreness and inflammation of your sinuses. Sinuses are hollow spaces in the bones around your face. Your sinuses are located:  · Around your eyes.  · In the middle of your forehead.  · Behind your nose.  · In your cheekbones.  Your sinuses and nasal passages are lined with a stringy fluid (mucus). Mucus normally drains out of your sinuses. When your nasal tissues become inflamed or swollen, the mucus can become trapped or blocked so air cannot flow through your sinuses. This allows bacteria, viruses, and funguses to grow, which leads to infection.  Sinusitis can develop quickly and last for 7-10 days (acute) or for more than 12 weeks (chronic). Sinusitis often develops after a cold.  CAUSES  This condition is caused by anything that creates swelling in the sinuses or stops mucus from draining, including:  · Allergies.  · Asthma.  · Bacterial or viral infection.  · Abnormally shaped bones between the nasal passages.  · Nasal growths that contain mucus (nasal polyps).  · Narrow sinus openings.  · Pollutants, such as chemicals or irritants in the air.  · A foreign object stuck in the nose.  · A fungal infection. This is rare.  RISK FACTORS  The following factors may make you more likely to develop this condition:  · Having allergies or asthma.  · Having had a recent cold or respiratory tract infection.  · Having structural deformities or blockages in your nose or sinuses.  · Having a weak immune system.  · Doing a lot of swimming or diving.  · Overusing nasal sprays.  · Smoking.  SYMPTOMS  The main symptoms of this condition are pain and a feeling of pressure around the affected sinuses. Other symptoms include:  · Upper toothache.  · Earache.  · Headache.  · Bad breath.  · Decreased sense of smell and taste.  · A cough that may get worse at night.  · Fatigue.  · Fever.  · Thick drainage from your nose. The drainage is often green and it may contain pus (purulent).  · Stuffy nose or  congestion.  · Postnasal drip. This is when extra mucus collects in the throat or back of the nose.  · Swelling and warmth over the affected sinuses.  · Sore throat.  · Sensitivity to light.  DIAGNOSIS  This condition is diagnosed based on symptoms, a medical history, and a physical exam. To find out if your condition is acute or chronic, your health care provider may:  · Look in your nose for signs of nasal polyps.  · Tap over the affected sinus to check for signs of infection.  · View the inside of your sinuses using an imaging device that has a light attached (endoscope).  If your health care provider suspects that you have chronic sinusitis, you may also:  · Be tested for allergies.  · Have a sample of mucus taken from your nose (nasal culture) and checked for bacteria.  · Have a mucus sample examined to see if your sinusitis is related to an allergy.  If your sinusitis does not respond to treatment and it lasts longer than 8 weeks, you may have an MRI or CT scan to check your sinuses. These scans also help to determine how severe your infection is.  In rare cases, a bone biopsy may be done to rule out more serious types of fungal sinus disease.  TREATMENT  Treatment for sinusitis depends on the cause and whether your condition is chronic or acute. If a virus is causing your sinusitis, your symptoms will go away on their own within 10 days. You may be given medicines to relieve your symptoms, including:  · Topical nasal decongestants. They shrink swollen nasal passages and let mucus drain from your sinuses.  · Antihistamines. These drugs block inflammation that is triggered by allergies. This can help to ease swelling in your nose and sinuses.  · Topical nasal corticosteroids. These are nasal sprays that ease inflammation and swelling in your nose and sinuses.  · Nasal saline washes. These rinses can help to get rid of thick mucus in your nose.  If your condition is caused by bacteria, you will be given an  antibiotic medicine. If your condition is caused by a fungus, you will be given an antifungal medicine.  Surgery may be needed to correct underlying conditions, such as narrow nasal passages. Surgery may also be needed to remove polyps.  HOME CARE INSTRUCTIONS  Medicines  · Take, use, or apply over-the-counter and prescription medicines only as told by your health care provider. These may include nasal sprays.  · If you were prescribed an antibiotic medicine, take it as told by your health care provider. Do not stop taking the antibiotic even if you start to feel better.  Hydrate and Humidify  · Drink enough water to keep your urine clear or pale yellow. Staying hydrated will help to thin your mucus.  · Use a cool mist humidifier to keep the humidity level in your home above 50%.  · Inhale steam for 10-15 minutes, 3-4 times a day or as told by your health care provider. You can do this in the bathroom while a hot shower is running.  · Limit your exposure to cool or dry air.  Rest  · Rest as much as possible.  · Sleep with your head raised (elevated).  · Make sure to get enough sleep each night.  General Instructions  · Apply a warm, moist washcloth to your face 3-4 times a day or as told by your health care provider. This will help with discomfort.  · Wash your hands often with soap and water to reduce your exposure to viruses and other germs. If soap and water are not available, use hand .  · Do not smoke. Avoid being around people who are smoking (secondhand smoke).  · Keep all follow-up visits as told by your health care provider. This is important.  SEEK MEDICAL CARE IF:  · You have a fever.  · Your symptoms get worse.  · Your symptoms do not improve within 10 days.  SEEK IMMEDIATE MEDICAL CARE IF:  · You have a severe headache.  · You have persistent vomiting.  · You have pain or swelling around your face or eyes.  · You have vision problems.  · You develop confusion.  · Your neck is stiff.  · You have  trouble breathing.     This information is not intended to replace advice given to you by your health care provider. Make sure you discuss any questions you have with your health care provider.     Document Released: 12/18/2006 Document Revised: 04/10/2017 Document Reviewed: 10/12/2016  [employer] Interactive Patient Education ©2017 [employer]    Drink plenty of water and other noncaffeinated fluids  Use saline nasal spray liberally to both nostrils  You may continue to use Flonase per instructions  Take antibiotics as prescribed even if you begin to feel better  See your primary care provider if no improvement within 2-3 days, you get worse or you develop new symptoms

## 2018-02-23 ENCOUNTER — OFFICE VISIT (OUTPATIENT)
Dept: RETAIL CLINIC | Facility: CLINIC | Age: 38
End: 2018-02-23

## 2018-02-23 VITALS
SYSTOLIC BLOOD PRESSURE: 120 MMHG | HEART RATE: 92 BPM | TEMPERATURE: 98.2 F | HEIGHT: 63 IN | DIASTOLIC BLOOD PRESSURE: 78 MMHG | RESPIRATION RATE: 20 BRPM | BODY MASS INDEX: 40.4 KG/M2 | WEIGHT: 228 LBS | OXYGEN SATURATION: 98 %

## 2018-02-23 DIAGNOSIS — J10.1 INFLUENZA A: Primary | ICD-10-CM

## 2018-02-23 LAB
EXPIRATION DATE: ABNORMAL
FLUAV AG NPH QL: POSITIVE
FLUBV AG NPH QL: NEGATIVE
INTERNAL CONTROL: ABNORMAL
Lab: ABNORMAL

## 2018-02-23 PROCEDURE — 87804 INFLUENZA ASSAY W/OPTIC: CPT | Performed by: NURSE PRACTITIONER

## 2018-02-23 PROCEDURE — 99213 OFFICE O/P EST LOW 20 MIN: CPT | Performed by: NURSE PRACTITIONER

## 2018-02-23 RX ORDER — ALBUTEROL SULFATE 90 UG/1
2 AEROSOL, METERED RESPIRATORY (INHALATION) EVERY 4 HOURS PRN
Qty: 1 INHALER | Refills: 0 | Status: SHIPPED | OUTPATIENT
Start: 2018-02-23 | End: 2018-07-02

## 2018-02-23 RX ORDER — DEXTROMETHORPHAN HYDROBROMIDE AND PROMETHAZINE HYDROCHLORIDE 15; 6.25 MG/5ML; MG/5ML
5 SYRUP ORAL NIGHTLY PRN
Qty: 180 ML | Refills: 0 | Status: SHIPPED | OUTPATIENT
Start: 2018-02-23 | End: 2018-07-02

## 2018-02-23 RX ORDER — FLUTICASONE PROPIONATE 50 MCG
2 SPRAY, SUSPENSION (ML) NASAL DAILY
Qty: 1 BOTTLE | Refills: 0 | Status: SHIPPED | OUTPATIENT
Start: 2018-02-23 | End: 2018-03-09

## 2018-02-23 RX ORDER — GUAIFENESIN, PSEUDOEPHEDRINE HYDROCHLORIDE 600; 60 MG/1; MG/1
1 TABLET, EXTENDED RELEASE ORAL EVERY 12 HOURS
Qty: 28 TABLET | Refills: 0 | Status: SHIPPED | OUTPATIENT
Start: 2018-02-23 | End: 2018-02-27 | Stop reason: SINTOL

## 2018-02-23 NOTE — PROGRESS NOTES
Dominic Hawk is a 38 y.o. female.     Facial Pain   This is a new problem. The current episode started in the past 7 days. The problem has been gradually worsening. Associated symptoms include chills, congestion (green-yellow), coughing, diaphoresis, fatigue, headaches, myalgias, neck pain, a sore throat and swollen glands. Pertinent negatives include no abdominal pain, anorexia, arthralgias, change in bowel habit, chest pain, fever, joint swelling, nausea, numbness, rash, urinary symptoms, vertigo, visual change, vomiting or weakness. Nothing aggravates the symptoms. She has tried acetaminophen (decongestants) for the symptoms. The treatment provided mild relief.        Current Outpatient Prescriptions on File Prior to Visit   Medication Sig Dispense Refill   • benzonatate (TESSALON) 200 MG capsule Take 1 capsule by mouth 3 (Three) Times a Day As Needed for Cough. 21 capsule 0   • [DISCONTINUED] albuterol (PROVENTIL HFA;VENTOLIN HFA) 108 (90 Base) MCG/ACT inhaler Inhale 2 puffs Every 4 (Four) Hours As Needed for Wheezing or Shortness of Air. 1 inhaler 0   • [DISCONTINUED] fluticasone (FLONASE) 50 MCG/ACT nasal spray 2 sprays into each nostril Daily. 1 each 0   • [DISCONTINUED] promethazine-dextromethorphan (PROMETHAZINE-DM) 6.25-15 MG/5ML syrup Take 5 mL by mouth At Night As Needed for Cough (at bedtime as needed). 180 mL 0     No current facility-administered medications on file prior to visit.        Allergies   Allergen Reactions   • Penicillins Hives       Past Medical History:   Diagnosis Date   • Migraines        Past Surgical History:   Procedure Laterality Date   • TONSILLECTOMY         History reviewed. No pertinent family history.    Social History     Social History   • Marital status: Unknown     Spouse name: N/A   • Number of children: N/A   • Years of education: N/A     Occupational History   • Not on file.     Social History Main Topics   • Smoking status: Current Every Day Smoker      "Packs/day: 0.50     Years: 15.00   • Smokeless tobacco: Never Used      Comment: has been cutting down. Is wanting to go to Dr to discuss Chantix   • Alcohol use Yes      Comment: occasionally   • Drug use: No   • Sexual activity: Defer     Other Topics Concern   • Not on file     Social History Narrative       Review of Systems   Constitutional: Positive for activity change, appetite change, chills, diaphoresis and fatigue. Negative for fever.   HENT: Positive for congestion (green-yellow), rhinorrhea, sinus pain, sinus pressure, sore throat and voice change.    Eyes: Negative for pain, discharge and itching.   Respiratory: Positive for cough, chest tightness and wheezing.    Cardiovascular: Negative for chest pain.   Gastrointestinal: Negative for abdominal pain, anorexia, change in bowel habit, nausea and vomiting.   Musculoskeletal: Positive for myalgias and neck pain. Negative for arthralgias and joint swelling.   Skin: Negative for rash.   Neurological: Positive for dizziness and headaches. Negative for vertigo, weakness and numbness.       /78  Pulse 92  Temp 98.2 °F (36.8 °C) (Oral)   Resp 20  Ht 160 cm (63\")  Wt 103 kg (228 lb)  SpO2 98%  BMI 40.39 kg/m2    Objective   Physical Exam   Constitutional: She is oriented to person, place, and time. She appears well-developed and well-nourished. She is cooperative. She appears ill.   HENT:   Head: Normocephalic and atraumatic.   Right Ear: Tympanic membrane, external ear and ear canal normal.   Left Ear: Tympanic membrane, external ear and ear canal normal.   Nose: Sinus tenderness present. Right sinus exhibits maxillary sinus tenderness and frontal sinus tenderness. Left sinus exhibits maxillary sinus tenderness and frontal sinus tenderness.       Mouth/Throat: Uvula is midline and mucous membranes are normal. Posterior oropharyngeal erythema present. No posterior oropharyngeal edema.   Eyes: Conjunctivae and lids are normal.   Cardiovascular: " Normal heart sounds.    Pulmonary/Chest: Effort normal and breath sounds normal.   Lymphadenopathy:     She has cervical adenopathy.   Neurological: She is alert and oriented to person, place, and time.   Skin: Skin is warm and dry. No rash noted.   Psychiatric: She has a normal mood and affect. Her speech is normal and behavior is normal.       Procedures None    Assessment/Plan   Diagnoses and all orders for this visit:    Influenza A  -     POC Influenza A / B  -     promethazine-dextromethorphan (PROMETHAZINE-DM) 6.25-15 MG/5ML syrup; Take 5 mL by mouth At Night As Needed for Cough (at bedtime as needed).  -     pseudoephedrine-guaifenesin (MUCINEX D)  MG per 12 hr tablet; Take 1 tablet by mouth Every 12 (Twelve) Hours for 14 days.  -     fluticasone (FLONASE) 50 MCG/ACT nasal spray; 2 sprays into each nostril Daily for 14 days.  -     albuterol (PROVENTIL HFA;VENTOLIN HFA) 108 (90 Base) MCG/ACT inhaler; Inhale 2 puffs Every 4 (Four) Hours As Needed for Wheezing or Shortness of Air.      Results for orders placed or performed in visit on 02/23/18   POC Influenza A / B   Result Value Ref Range    Rapid Influenza A Ag positive     Rapid Influenza B Ag negative     Internal Control Passed Passed    Lot Number [id number]     Expiration Date 11-8-20        Follow up with PCP or go to the nearest emergency room if symptoms worsen or fail to improve.

## 2018-02-23 NOTE — PATIENT INSTRUCTIONS

## 2018-02-27 ENCOUNTER — OFFICE VISIT (OUTPATIENT)
Dept: RETAIL CLINIC | Facility: CLINIC | Age: 38
End: 2018-02-27

## 2018-02-27 VITALS
OXYGEN SATURATION: 98 % | TEMPERATURE: 98.1 F | RESPIRATION RATE: 18 BRPM | DIASTOLIC BLOOD PRESSURE: 90 MMHG | WEIGHT: 228 LBS | BODY MASS INDEX: 40.4 KG/M2 | HEART RATE: 97 BPM | SYSTOLIC BLOOD PRESSURE: 138 MMHG | HEIGHT: 63 IN

## 2018-02-27 DIAGNOSIS — R03.0 ELEVATED BLOOD PRESSURE READING: ICD-10-CM

## 2018-02-27 DIAGNOSIS — J11.1 BRONCHITIS WITH FLU: Primary | ICD-10-CM

## 2018-02-27 PROCEDURE — 99213 OFFICE O/P EST LOW 20 MIN: CPT | Performed by: NURSE PRACTITIONER

## 2018-02-27 RX ORDER — AZITHROMYCIN 250 MG/1
TABLET, FILM COATED ORAL
Qty: 6 TABLET | Refills: 0 | Status: SHIPPED | OUTPATIENT
Start: 2018-02-27 | End: 2018-07-02

## 2018-02-27 RX ORDER — NICOTINE 21 MG/24HR
1 PATCH, TRANSDERMAL 24 HOURS TRANSDERMAL EVERY 24 HOURS
COMMUNITY
End: 2021-11-10

## 2018-02-27 RX ORDER — DEXAMETHASONE 2 MG/1
TABLET ORAL
Qty: 11 TABLET | Refills: 0 | Status: SHIPPED | OUTPATIENT
Start: 2018-02-27 | End: 2018-07-02

## 2018-02-27 NOTE — PROGRESS NOTES
Subjective   Earache; Wheezing; and Dizziness      History of Present Illness   Alicia Hawk is a 38 y.o. female who presents with bilateral earache, dizziness, cough/wheeze. Patient  diagnosed with Flu A on 2/23/17, prescribed multiple medications for symptomatic relief. Patient also began using Nicotine patch to quit smoking while ill. She was cutting patch in half, however used full dose patch today. Patient with residual flu symptoms, although improved and she returned to work.  She reports chest tightness, wheezing and dizziness despite use of inhaler and all prescribed medications.     History Obtained from: Patient    Past Medical History:   Diagnosis Date   • Migraines      Past Surgical History:   Procedure Laterality Date   • TONSILLECTOMY       Social History     Social History   • Marital status: Unknown     Spouse name: N/A   • Number of children: N/A   • Years of education: N/A     Occupational History   • Not on file.     Social History Main Topics   • Smoking status: Former Smoker     Packs/day: 0.50     Years: 15.00     Types: Cigarettes   • Smokeless tobacco: Never Used      Comment: Just Quit   • Alcohol use Yes      Comment: occasionally   • Drug use: No   • Sexual activity: Defer     Other Topics Concern   • Not on file     Social History Narrative     No family history on file.  Allergies   Allergen Reactions   • Penicillins Hives     Current Outpatient Prescriptions   Medication Sig Dispense Refill   • albuterol (PROVENTIL HFA;VENTOLIN HFA) 108 (90 Base) MCG/ACT inhaler Inhale 2 puffs Every 4 (Four) Hours As Needed for Wheezing or Shortness of Air. 1 inhaler 0   • benzonatate (TESSALON) 200 MG capsule Take 1 capsule by mouth 3 (Three) Times a Day As Needed for Cough. 21 capsule 0   • fluticasone (FLONASE) 50 MCG/ACT nasal spray 2 sprays into each nostril Daily for 14 days. 1 bottle 0   • nicotine (NICODERM CQ) 21 MG/24HR patch Place 1 patch on the skin Daily.     •  "promethazine-dextromethorphan (PROMETHAZINE-DM) 6.25-15 MG/5ML syrup Take 5 mL by mouth At Night As Needed for Cough (at bedtime as needed). 180 mL 0   • azithromycin (ZITHROMAX Z-CICI) 250 MG tablet Take 2 tablets the first day, then 1 tablet daily for 4 days. 6 tablet 0   • dexamethasone (DECADRON) 2 MG tablet Take 3 tablets now, then 2 tablets by mouth twice daily thereafter 11 tablet 0     No current facility-administered medications for this visit.         The following portions of the patient's history were reviewed and updated as appropriate: allergies, current medications, past family history, past medical history, past social history and past surgical history.    Review of Systems   Constitutional: Positive for activity change (decreased), diaphoresis and fatigue.   HENT: Positive for congestion, ear pain, rhinorrhea and sinus pressure. Negative for sore throat, trouble swallowing and voice change.    Eyes: Negative.    Respiratory: Positive for cough, chest tightness, shortness of breath (intermittent) and wheezing.    Cardiovascular: Negative for chest pain and palpitations.   Gastrointestinal: Negative.    Musculoskeletal: Negative for neck pain and neck stiffness. Myalgias: improved.   Skin: Negative for rash and wound.   Neurological: Positive for dizziness and headaches. Negative for tremors and speech difficulty.   Hematological: Negative for adenopathy.   Psychiatric/Behavioral: Negative for agitation. The patient is not nervous/anxious.        Objective     VITAL SIGNS:   Vitals:    02/27/18 1715   BP: 138/90   BP Location: Left arm   Patient Position: Sitting   Pulse: 97   Resp: 18   Temp: 98.1 °F (36.7 °C)   TempSrc: Oral   SpO2: 98%   Weight: 103 kg (228 lb)   Height: 160 cm (63\")   Body mass index is 40.39 kg/(m^2).    Physical Exam   Constitutional:  Non-toxic appearance. Ill appearance: mildly-ill appearing. No distress.   HENT:   Head: Normocephalic and atraumatic.   Right Ear: No swelling or " tenderness. Tympanic membrane is not perforated, not erythematous, not retracted and not bulging. A middle ear effusion is present.   Left Ear: No swelling or tenderness. Tympanic membrane is not perforated, not erythematous, not retracted and not bulging. A middle ear effusion is present.   Nose: Mucosal edema and rhinorrhea present. Right sinus exhibits no maxillary sinus tenderness and no frontal sinus tenderness. Left sinus exhibits no maxillary sinus tenderness and no frontal sinus tenderness.   Mouth/Throat: Uvula is midline and mucous membranes are normal. Posterior oropharyngeal erythema present. No oropharyngeal exudate or posterior oropharyngeal edema.   Eyes: Conjunctivae, EOM and lids are normal. Pupils are equal, round, and reactive to light. No scleral icterus.   Neck: Trachea normal and normal range of motion. Neck supple. No rigidity. No tracheal deviation present.   Mild voice hoarseness   Cardiovascular: Regular rhythm.  Tachycardia present.    No murmur heard.  Pulmonary/Chest: No accessory muscle usage or stridor. No tachypnea. No respiratory distress. She has no decreased breath sounds. Wheezes: expiratory wheeze with cough. She has no rhonchi. She has no rales. She exhibits no tenderness.   Lymphadenopathy:     She has no cervical adenopathy.        Right: No supraclavicular adenopathy present.        Left: No supraclavicular adenopathy present.   Neurological: She is alert. She has normal strength. She is not disoriented. No cranial nerve deficit. Coordination and gait normal.   Skin: Skin is warm and dry. No rash noted. She is not diaphoretic. No cyanosis. No pallor.   Psychiatric: Her behavior is normal. She is attentive.       LABS: Reviewed  Results for orders placed or performed in visit on 02/23/18   POC Influenza A / B   Result Value Ref Range    Rapid Influenza A Ag positive     Rapid Influenza B Ag negative     Internal Control Passed Passed    Lot Number [id number]     Expiration Date  11-8-20        CLINICAL QUALITY MEASURES:  Tobacco Screening & Intervention Screened & identified as tobacco non-user. Former smoker   WEIGHT SCREENING/BMI  Not eligible, overweight & managed by other physician     Assessment/Plan     Alicia was seen today for earache, wheezing and dizziness.    Diagnoses and all orders for this visit:    Bronchitis with flu    Elevated blood pressure reading    Other orders  -     dexamethasone (DECADRON) 2 MG tablet; Take 3 tablets now, then 2 tablets by mouth twice daily thereafter  -     azithromycin (ZITHROMAX Z-CICI) 250 MG tablet; Take 2 tablets the first day, then 1 tablet daily for 4 days.    Discontinue Mucinex D  Decrease nicotine dose  Monitor BP at home    PLAN:  Patient should follow up with primary care provider. See sooner if symptoms fail to improve, worsen or for the development of new symptoms that need attention.    The patient voiced understanding and agreement to the patient treatment plan and instructions     JACQUES Lundberg

## 2018-02-27 NOTE — PATIENT INSTRUCTIONS
Hypertension  Hypertension is another name for high blood pressure. High blood pressure forces your heart to work harder to pump blood. This can cause problems over time.  There are two numbers in a blood pressure reading. There is a top number (systolic) over a bottom number (diastolic). It is best to have a blood pressure below 120/80. Healthy choices can help lower your blood pressure. You may need medicine to help lower your blood pressure if:  · Your blood pressure cannot be lowered with healthy choices.  · Your blood pressure is higher than 130/80.  Follow these instructions at home:  Eating and drinking   · If directed, follow the DASH eating plan. This diet includes:  ¨ Filling half of your plate at each meal with fruits and vegetables.  ¨ Filling one quarter of your plate at each meal with whole grains. Whole grains include whole wheat pasta, brown rice, and whole grain bread.  ¨ Eating or drinking low-fat dairy products, such as skim milk or low-fat yogurt.  ¨ Filling one quarter of your plate at each meal with low-fat (lean) proteins. Low-fat proteins include fish, skinless chicken, eggs, beans, and tofu.  ¨ Avoiding fatty meat, cured and processed meat, or chicken with skin.  ¨ Avoiding premade or processed food.  · Eat less than 1,500 mg of salt (sodium) a day.  · Limit alcohol use to no more than 1 drink a day for nonpregnant women and 2 drinks a day for men. One drink equals 12 oz of beer, 5 oz of wine, or 1½ oz of hard liquor.  Lifestyle   · Work with your doctor to stay at a healthy weight or to lose weight. Ask your doctor what the best weight is for you.  · Get at least 30 minutes of exercise that causes your heart to beat faster (aerobic exercise) most days of the week. This may include walking, swimming, or biking.  · Get at least 30 minutes of exercise that strengthens your muscles (resistance exercise) at least 3 days a week. This may include lifting weights or pilates.  · Do not use any  products that contain nicotine or tobacco. This includes cigarettes and e-cigarettes. If you need help quitting, ask your doctor.  · Check your blood pressure at home as told by your doctor.  · Keep all follow-up visits as told by your doctor. This is important.  Medicines   · Take over-the-counter and prescription medicines only as told by your doctor. Follow directions carefully.  · Do not skip doses of blood pressure medicine. The medicine does not work as well if you skip doses. Skipping doses also puts you at risk for problems.  · Ask your doctor about side effects or reactions to medicines that you should watch for.  Contact a doctor if:  · You think you are having a reaction to the medicine you are taking.  · You have headaches that keep coming back (recurring).  · You feel dizzy.  · You have swelling in your ankles.  · You have trouble with your vision.  Get help right away if:  · You get a very bad headache.  · You start to feel confused.  · You feel weak or numb.  · You feel faint.  · You get very bad pain in your:  ¨ Chest.  ¨ Belly (abdomen).  · You throw up (vomit) more than once.  · You have trouble breathing.  Summary  · Hypertension is another name for high blood pressure.  · Making healthy choices can help lower blood pressure. If your blood pressure cannot be controlled with healthy choices, you may need to take medicine.  This information is not intended to replace advice given to you by your health care provider. Make sure you discuss any questions you have with your health care provider.  Document Released: 06/05/2009 Document Revised: 11/15/2017 Document Reviewed: 11/15/2017  [employer] Patient Education © 2017 [employer]  Acute Bronchitis, Adult  Acute bronchitis is sudden (acute) swelling of the air tubes (bronchi) in the lungs. Acute bronchitis causes these tubes to fill with mucus, which can make it hard to breathe. It can also cause coughing or wheezing.  In adults, acute  bronchitis usually goes away within 2 weeks. A cough caused by bronchitis may last up to 3 weeks. Smoking, allergies, and asthma can make the condition worse. Repeated episodes of bronchitis may cause further lung problems, such as chronic obstructive pulmonary disease (COPD).  What are the causes?  This condition can be caused by germs and by substances that irritate the lungs, including:  · Cold and flu viruses. This condition is most often caused by the same virus that causes a cold.  · Bacteria.  · Exposure to tobacco smoke, dust, fumes, and air pollution.  What increases the risk?  This condition is more likely to develop in people who:  · Have close contact with someone with acute bronchitis.  · Are exposed to lung irritants, such as tobacco smoke, dust, fumes, and vapors.  · Have a weak immune system.  · Have a respiratory condition such as asthma.  What are the signs or symptoms?  Symptoms of this condition include:  · A cough.  · Coughing up clear, yellow, or green mucus.  · Wheezing.  · Chest congestion.  · Shortness of breath.  · A fever.  · Body aches.  · Chills.  · A sore throat.  How is this diagnosed?  This condition is usually diagnosed with a physical exam. During the exam, your health care provider may order tests, such as chest X-rays, to rule out other conditions. He or she may also:  · Test a sample of your mucus for bacterial infection.  · Check the level of oxygen in your blood. This is done to check for pneumonia.  · Do a chest X-ray or lung function testing to rule out pneumonia and other conditions.  · Perform blood tests.  Your health care provider will also ask about your symptoms and medical history.  How is this treated?  Most cases of acute bronchitis clear up over time without treatment. Your health care provider may recommend:  · Drinking more fluids. Drinking more makes your mucus thinner, which may make it easier to breathe.  · Taking a medicine for a fever or cough.  · Taking an  antibiotic medicine.  · Using an inhaler to help improve shortness of breath and to control a cough.  · Using a cool mist vaporizer or humidifier to make it easier to breathe.  Follow these instructions at home:  Medicines   · Take over-the-counter and prescription medicines only as told by your health care provider.  · If you were prescribed an antibiotic, take it as told by your health care provider. Do not stop taking the antibiotic even if you start to feel better.  General instructions   · Get plenty of rest.  · Drink enough fluids to keep your urine clear or pale yellow.  · Avoid smoking and secondhand smoke. Exposure to cigarette smoke or irritating chemicals will make bronchitis worse. If you smoke and you need help quitting, ask your health care provider. Quitting smoking will help your lungs heal faster.  · Use an inhaler, cool mist vaporizer, or humidifier as told by your health care provider.  · Keep all follow-up visits as told by your health care provider. This is important.  How is this prevented?  To lower your risk of getting this condition again:  · Wash your hands often with soap and water. If soap and water are not available, use hand .  · Avoid contact with people who have cold symptoms.  · Try not to touch your hands to your mouth, nose, or eyes.  · Make sure to get the flu shot every year.  Contact a health care provider if:  · Your symptoms do not improve in 2 weeks of treatment.  Get help right away if:  · You cough up blood.  · You have chest pain.  · You have severe shortness of breath.  · You become dehydrated.  · You faint or keep feeling like you are going to faint.  · You keep vomiting.  · You have a severe headache.  · Your fever or chills gets worse.  This information is not intended to replace advice given to you by your health care provider. Make sure you discuss any questions you have with your health care provider.  Document Released: 01/25/2006 Document Revised:  07/12/2017 Document Reviewed: 06/07/2017  [employer] Patient Education © 2017 [employer]    Record blood pressure readings at home  Discontinue Mucinex D  Reduce Nicotine patch dosing  Take medication with food  Drink plenty of water and other decaffeinated fluids  Rest when able!  Go to ER for chest pain, coughing blood or trouble breathing

## 2018-07-02 ENCOUNTER — OFFICE VISIT (OUTPATIENT)
Dept: INTERNAL MEDICINE | Facility: CLINIC | Age: 38
End: 2018-07-02

## 2018-07-02 ENCOUNTER — HOSPITAL ENCOUNTER (OUTPATIENT)
Dept: GENERAL RADIOLOGY | Facility: HOSPITAL | Age: 38
Discharge: HOME | End: 2018-07-02
Admitting: NURSE PRACTITIONER

## 2018-07-02 VITALS
SYSTOLIC BLOOD PRESSURE: 128 MMHG | WEIGHT: 222.8 LBS | HEART RATE: 75 BPM | BODY MASS INDEX: 39.48 KG/M2 | RESPIRATION RATE: 16 BRPM | DIASTOLIC BLOOD PRESSURE: 80 MMHG | OXYGEN SATURATION: 100 % | HEIGHT: 63 IN | TEMPERATURE: 97.4 F

## 2018-07-02 DIAGNOSIS — Z23 NEED FOR HEPATITIS A IMMUNIZATION: ICD-10-CM

## 2018-07-02 DIAGNOSIS — M54.42 LOW BACK PAIN WITH LEFT-SIDED SCIATICA, UNSPECIFIED BACK PAIN LATERALITY, UNSPECIFIED CHRONICITY: ICD-10-CM

## 2018-07-02 DIAGNOSIS — Z72.0 TOBACCO ABUSE: ICD-10-CM

## 2018-07-02 DIAGNOSIS — R20.2 PARESTHESIA: ICD-10-CM

## 2018-07-02 DIAGNOSIS — M79.672 LEFT FOOT PAIN: ICD-10-CM

## 2018-07-02 DIAGNOSIS — Z23 NEED FOR PNEUMOCOCCAL VACCINE: ICD-10-CM

## 2018-07-02 DIAGNOSIS — R21 RASH: ICD-10-CM

## 2018-07-02 DIAGNOSIS — M79.672 LEFT FOOT PAIN: Primary | ICD-10-CM

## 2018-07-02 LAB
25(OH)D3 SERPL-MCNC: 17.9 NG/ML
ALBUMIN SERPL-MCNC: 4.25 G/DL (ref 3.2–4.8)
ALBUMIN/GLOB SERPL: 1.7 G/DL (ref 1.5–2.5)
ALP SERPL-CCNC: 49 U/L (ref 25–100)
ALT SERPL W P-5'-P-CCNC: 28 U/L (ref 7–40)
ANION GAP SERPL CALCULATED.3IONS-SCNC: 7 MMOL/L (ref 3–11)
AST SERPL-CCNC: 18 U/L (ref 0–33)
BASOPHILS # BLD AUTO: 0.06 10*3/MM3 (ref 0–0.2)
BASOPHILS NFR BLD AUTO: 0.6 % (ref 0–1)
BILIRUB SERPL-MCNC: 0.3 MG/DL (ref 0.3–1.2)
BUN BLD-MCNC: 10 MG/DL (ref 9–23)
BUN/CREAT SERPL: 14.9 (ref 7–25)
CALCIUM SPEC-SCNC: 9 MG/DL (ref 8.7–10.4)
CHLORIDE SERPL-SCNC: 106 MMOL/L (ref 99–109)
CO2 SERPL-SCNC: 24 MMOL/L (ref 20–31)
CREAT BLD-MCNC: 0.67 MG/DL (ref 0.6–1.3)
CRP SERPL-MCNC: 0.14 MG/DL (ref 0–1)
DEPRECATED RDW RBC AUTO: 43.8 FL (ref 37–54)
EOSINOPHIL # BLD AUTO: 0.33 10*3/MM3 (ref 0–0.3)
EOSINOPHIL NFR BLD AUTO: 3.3 % (ref 0–3)
ERYTHROCYTE [DISTWIDTH] IN BLOOD BY AUTOMATED COUNT: 12.7 % (ref 11.3–14.5)
ERYTHROCYTE [SEDIMENTATION RATE] IN BLOOD: 17 MM/HR (ref 0–20)
GFR SERPL CREATININE-BSD FRML MDRD: 99 ML/MIN/1.73
GLOBULIN UR ELPH-MCNC: 2.6 GM/DL
GLUCOSE BLD-MCNC: 110 MG/DL (ref 70–100)
HCT VFR BLD AUTO: 45.1 % (ref 34.5–44)
HGB BLD-MCNC: 14.9 G/DL (ref 11.5–15.5)
IMM GRANULOCYTES # BLD: 0.02 10*3/MM3 (ref 0–0.03)
IMM GRANULOCYTES NFR BLD: 0.2 % (ref 0–0.6)
LYMPHOCYTES # BLD AUTO: 3.44 10*3/MM3 (ref 0.6–4.8)
LYMPHOCYTES NFR BLD AUTO: 34.4 % (ref 24–44)
MCH RBC QN AUTO: 31.5 PG (ref 27–31)
MCHC RBC AUTO-ENTMCNC: 33 G/DL (ref 32–36)
MCV RBC AUTO: 95.3 FL (ref 80–99)
MONOCYTES # BLD AUTO: 0.58 10*3/MM3 (ref 0–1)
MONOCYTES NFR BLD AUTO: 5.8 % (ref 0–12)
NEUTROPHILS # BLD AUTO: 5.58 10*3/MM3 (ref 1.5–8.3)
NEUTROPHILS NFR BLD AUTO: 55.9 % (ref 41–71)
PLATELET # BLD AUTO: 279 10*3/MM3 (ref 150–450)
PMV BLD AUTO: 11 FL (ref 6–12)
POTASSIUM BLD-SCNC: 4.8 MMOL/L (ref 3.5–5.5)
PROT SERPL-MCNC: 6.8 G/DL (ref 5.7–8.2)
RBC # BLD AUTO: 4.73 10*6/MM3 (ref 3.89–5.14)
SODIUM BLD-SCNC: 137 MMOL/L (ref 132–146)
TSH SERPL DL<=0.05 MIU/L-ACNC: 1.99 MIU/ML (ref 0.35–5.35)
VIT B12 BLD-MCNC: 394 PG/ML (ref 211–911)
WBC NRBC COR # BLD: 9.99 10*3/MM3 (ref 3.5–10.8)

## 2018-07-02 PROCEDURE — 80050 GENERAL HEALTH PANEL: CPT | Performed by: NURSE PRACTITIONER

## 2018-07-02 PROCEDURE — 90732 PPSV23 VACC 2 YRS+ SUBQ/IM: CPT | Performed by: NURSE PRACTITIONER

## 2018-07-02 PROCEDURE — 90472 IMMUNIZATION ADMIN EACH ADD: CPT | Performed by: NURSE PRACTITIONER

## 2018-07-02 PROCEDURE — 86038 ANTINUCLEAR ANTIBODIES: CPT | Performed by: NURSE PRACTITIONER

## 2018-07-02 PROCEDURE — 72100 X-RAY EXAM L-S SPINE 2/3 VWS: CPT

## 2018-07-02 PROCEDURE — 90632 HEPA VACCINE ADULT IM: CPT | Performed by: NURSE PRACTITIONER

## 2018-07-02 PROCEDURE — 82607 VITAMIN B-12: CPT | Performed by: NURSE PRACTITIONER

## 2018-07-02 PROCEDURE — 86140 C-REACTIVE PROTEIN: CPT | Performed by: NURSE PRACTITIONER

## 2018-07-02 PROCEDURE — 85652 RBC SED RATE AUTOMATED: CPT | Performed by: NURSE PRACTITIONER

## 2018-07-02 PROCEDURE — 73630 X-RAY EXAM OF FOOT: CPT

## 2018-07-02 PROCEDURE — 82306 VITAMIN D 25 HYDROXY: CPT | Performed by: NURSE PRACTITIONER

## 2018-07-02 PROCEDURE — 90471 IMMUNIZATION ADMIN: CPT | Performed by: NURSE PRACTITIONER

## 2018-07-02 PROCEDURE — 99213 OFFICE O/P EST LOW 20 MIN: CPT | Performed by: NURSE PRACTITIONER

## 2018-07-02 RX ORDER — ALPRAZOLAM 0.5 MG/1
TABLET ORAL
Refills: 0 | COMMUNITY
Start: 2018-06-18 | End: 2021-11-10

## 2018-07-02 RX ORDER — ESCITALOPRAM OXALATE 20 MG/1
20 TABLET ORAL DAILY
Refills: 1 | COMMUNITY
Start: 2018-06-11

## 2018-07-02 RX ORDER — FLUTICASONE PROPIONATE 50 MCG
SPRAY, SUSPENSION (ML) NASAL
Refills: 1 | COMMUNITY
Start: 2018-06-11

## 2018-07-02 NOTE — PROGRESS NOTES
Subjective   Alicia Hawk is a 38 y.o. female    Chief Complaint   Patient presents with   • Establish Care   • Left toe pain (pinky toe), foot and ankle pain     a lot of numbness and pain in the foot and toes. Discuss seeing podiatry.    • Neck, back pain, headaches     Started seeing a Chiropractor on 6/11/2018 and had some xrays done.   • Redness and warmness on chest     Has been flaring up for about 5 years. Seems to be more constant.      History of Present Illness     New pt here to establish care.      C/O left ankle, foot and toe pain - States that she hit her left pinky toe on a claw foot table about one month ago and has had pain since.  Also c/o left foot/toe numbness and tingling since the incident as well.      Neck/back pain and HA - has been seeing a chiropractor and sx's do seem to be improving.      Erythema of chest - states that for years she has had concerns with her chest becoming extremely red with with any activity, stress, heat.  She has mentioned this to several providers without any work up or concern.  No associated s/s.      Past Medical History:   Diagnosis Date   • Allergic    • Anxiety    • Migraines    • Tobacco abuse      Past Surgical History:   Procedure Laterality Date   • TONSILLECTOMY  2003     Allergies   Allergen Reactions   • Penicillins Hives     Family History   Problem Relation Age of Onset   • Bipolar disorder Mother    • Other Mother         prediabetes   • Heart disease Maternal Grandmother         pacer   • Diabetes Maternal Grandfather    • Hypertension Maternal Grandfather    • Heart attack Other      Social History     Social History   • Marital status: Single     Spouse name: N/A   • Number of children: N/A   • Years of education: N/A     Occupational History   • Not on file.     Social History Main Topics   • Smoking status: Current Every Day Smoker     Packs/day: 1.00     Years: 32.00     Types: Cigarettes   • Smokeless tobacco: Never Used   • Alcohol use Yes       Comment: 4-6 drinks per week   • Drug use: No   • Sexual activity: Defer      Comment: single     Other Topics Concern   • Not on file     Social History Narrative   • No narrative on file         The following portions of the patient's history were reviewed and updated as appropriate: allergies, current medications, past family history, past medical history, past social history, past surgical history and problem list.    Current Outpatient Prescriptions:   •  ALPRAZolam (XANAX) 0.5 MG tablet, take 1/2 to 1 tablet by mouth every 6 to 8 hours if needed for anxiety, Disp: , Rfl: 0  •  CHANTIX STARTING MONTH CICI 0.5 MG X 11 & 1 MG X 42 tablet, , Disp: , Rfl: 0  •  escitalopram (LEXAPRO) 20 MG tablet, Take 20 mg by mouth Daily., Disp: , Rfl: 1  •  fluticasone (FLONASE) 50 MCG/ACT nasal spray, instill 2 sprays into each nostril once daily, Disp: , Rfl: 1  •  nicotine (NICODERM CQ) 21 MG/24HR patch, Place 1 patch on the skin Daily., Disp: , Rfl:      Review of Systems   Constitutional: Negative for chills, fatigue and fever.   Respiratory: Negative for cough, chest tightness and shortness of breath.    Cardiovascular: Negative for chest pain.   Gastrointestinal: Negative for abdominal pain, diarrhea, nausea and vomiting.   Endocrine: Negative for cold intolerance and heat intolerance.   Musculoskeletal: Negative for arthralgias.        Left ankle, foot and toe pain   Skin: Positive for rash.   Neurological: Positive for numbness (left toe) and headaches. Negative for dizziness.       Objective   Physical Exam   Constitutional: She is oriented to person, place, and time. She appears well-developed and well-nourished.   HENT:   Head: Normocephalic and atraumatic.   Eyes: Conjunctivae and EOM are normal. Pupils are equal, round, and reactive to light.   Neck: Normal range of motion.   Cardiovascular: Normal rate, regular rhythm and normal heart sounds.    Pulmonary/Chest: Effort normal and breath sounds normal.  "  Abdominal: Soft. Bowel sounds are normal.   Musculoskeletal:        Cervical back: She exhibits decreased range of motion, tenderness and pain. She exhibits no bony tenderness, no swelling, no edema, no deformity, no laceration, no spasm and normal pulse.        Left foot: There is decreased range of motion. There is no tenderness (lateral), no bony tenderness, no swelling, normal capillary refill, no crepitus, no deformity and no laceration.   Neurological: She is alert and oriented to person, place, and time. She has normal reflexes.   Skin: Skin is warm and dry. Rash (anterior, chest with erythema) noted.   Psychiatric: She has a normal mood and affect. Her behavior is normal. Judgment and thought content normal.     Vitals:    07/02/18 1008   BP: 128/80   Pulse: 75   Resp: 16   Temp: 97.4 °F (36.3 °C)   TempSrc: Temporal Artery    SpO2: 100%   Weight: 101 kg (222 lb 12.8 oz)   Height: 158.8 cm (62.5\")         Assessment/Plan   Alicia was seen today for establish care, left toe pain (pinky toe), foot and ankle pain, neck, back pain, headaches and redness and warmness on chest.    Diagnoses and all orders for this visit:    Left foot pain  -     Cancel: XR foot 2 vw left; Future  -     CBC & Differential  -     Comprehensive Metabolic Panel  -     Vitamin D 25 Hydroxy  -     Vitamin B12  -     Sedimentation Rate  -     C-reactive Protein  -     LIZETT  -     CBC Auto Differential    Paresthesia  -     XR Spine Lumbar 2 or 3 View  -     Cancel: XR foot 2 vw left; Future  -     CBC & Differential  -     Comprehensive Metabolic Panel  -     Vitamin D 25 Hydroxy  -     Vitamin B12  -     Sedimentation Rate  -     C-reactive Protein  -     LIZETT  -     TSH  -     CBC Auto Differential    Low back pain with left-sided sciatica, unspecified back pain laterality, unspecified chronicity  -     XR Spine Lumbar 2 or 3 View  -     CBC & Differential  -     Comprehensive Metabolic Panel  -     Vitamin D 25 Hydroxy  -     Vitamin " B12  -     Sedimentation Rate  -     C-reactive Protein  -     LIZETT  -     CBC Auto Differential    Rash  -     CBC & Differential  -     Comprehensive Metabolic Panel  -     Vitamin D 25 Hydroxy  -     Vitamin B12  -     Sedimentation Rate  -     C-reactive Protein  -     LIZETT  -     TSH  -     CBC Auto Differential    Tobacco abuse  -     Pneumococcal Polysaccharide Vaccine 23-Valent Greater Than or Equal To 1yo Subcutaneous / IM    Need for hepatitis A immunization  -     Hepatitis A Vaccine Adult IM    Need for pneumococcal vaccine  -     Pneumococcal Polysaccharide Vaccine 23-Valent Greater Than or Equal To 1yo Subcutaneous / IM      Will ck xrays, sent with orders to the dx center  Labs sent to ck for rheumatology concern for rash/pain  Pneumovax updated  Hep A initiated  Return in about 6 weeks (around 8/13/2018) for Annual.

## 2018-07-03 LAB — ANA SER QL: NEGATIVE

## 2018-07-05 ENCOUNTER — TELEPHONE (OUTPATIENT)
Dept: INTERNAL MEDICINE | Facility: CLINIC | Age: 38
End: 2018-07-05

## 2018-07-05 DIAGNOSIS — M54.42 LEFT-SIDED LOW BACK PAIN WITH LEFT-SIDED SCIATICA, UNSPECIFIED CHRONICITY: Primary | ICD-10-CM

## 2018-07-05 DIAGNOSIS — M79.672 LEFT FOOT PAIN: ICD-10-CM

## 2018-07-05 RX ORDER — CHOLECALCIFEROL (VITAMIN D3) 1250 MCG
50000 CAPSULE ORAL
Qty: 8 CAPSULE | Refills: 0 | Status: SHIPPED | OUTPATIENT
Start: 2018-07-05 | End: 2018-08-24

## 2018-07-05 NOTE — TELEPHONE ENCOUNTER
----- Message from JACQUES Hoover sent at 7/3/2018  3:32 PM EDT -----  Xray of foot and back show mild arthritis changes, but are otherwise normal.  I would recommend PT.

## 2018-07-05 NOTE — TELEPHONE ENCOUNTER
PT order entered.      Vit D is low.  I have sent in Rx strength D3 that she will take once a week x 8 weeks, she will need to take OTC D3 2000 units after she completes the Rx dose. All other labs were WNL

## 2018-07-05 NOTE — TELEPHONE ENCOUNTER
Alicia has been notified. She would like a referral for Physical Therapy please.     She was also wanting to know if you have reviewed her lab results?

## 2018-09-17 ENCOUNTER — OFFICE VISIT (OUTPATIENT)
Dept: INTERNAL MEDICINE | Facility: CLINIC | Age: 38
End: 2018-09-17

## 2018-09-17 VITALS
OXYGEN SATURATION: 98 % | RESPIRATION RATE: 16 BRPM | HEART RATE: 92 BPM | HEIGHT: 63 IN | TEMPERATURE: 97.7 F | SYSTOLIC BLOOD PRESSURE: 130 MMHG | WEIGHT: 222.2 LBS | BODY MASS INDEX: 39.37 KG/M2 | DIASTOLIC BLOOD PRESSURE: 78 MMHG

## 2018-09-17 DIAGNOSIS — Z01.419 ENCOUNTER FOR GYNECOLOGICAL EXAMINATION WITHOUT ABNORMAL FINDING: ICD-10-CM

## 2018-09-17 DIAGNOSIS — Z00.00 ANNUAL PHYSICAL EXAM: Primary | ICD-10-CM

## 2018-09-17 DIAGNOSIS — N89.8 VAGINAL DISCHARGE: ICD-10-CM

## 2018-09-17 DIAGNOSIS — R00.2 PALPITATIONS: ICD-10-CM

## 2018-09-17 LAB
BILIRUB BLD-MCNC: NEGATIVE MG/DL
CLARITY, POC: CLEAR
COLOR UR: YELLOW
GLUCOSE UR STRIP-MCNC: NEGATIVE MG/DL
KETONES UR QL: NEGATIVE
LEUKOCYTE EST, POC: NEGATIVE
NITRITE UR-MCNC: NEGATIVE MG/ML
PH UR: 6 [PH] (ref 5–8)
PROT UR STRIP-MCNC: NEGATIVE MG/DL
RBC # UR STRIP: NEGATIVE /UL
SP GR UR: 1.02 (ref 1–1.03)
UROBILINOGEN UR QL: NORMAL

## 2018-09-17 PROCEDURE — 87798 DETECT AGENT NOS DNA AMP: CPT | Performed by: NURSE PRACTITIONER

## 2018-09-17 PROCEDURE — 87801 DETECT AGNT MULT DNA AMPLI: CPT | Performed by: NURSE PRACTITIONER

## 2018-09-17 PROCEDURE — 87491 CHLMYD TRACH DNA AMP PROBE: CPT | Performed by: NURSE PRACTITIONER

## 2018-09-17 PROCEDURE — 93000 ELECTROCARDIOGRAM COMPLETE: CPT | Performed by: NURSE PRACTITIONER

## 2018-09-17 PROCEDURE — 81003 URINALYSIS AUTO W/O SCOPE: CPT | Performed by: NURSE PRACTITIONER

## 2018-09-17 PROCEDURE — 87661 TRICHOMONAS VAGINALIS AMPLIF: CPT | Performed by: NURSE PRACTITIONER

## 2018-09-17 PROCEDURE — 87591 N.GONORRHOEAE DNA AMP PROB: CPT | Performed by: NURSE PRACTITIONER

## 2018-09-17 PROCEDURE — 99395 PREV VISIT EST AGE 18-39: CPT | Performed by: NURSE PRACTITIONER

## 2018-09-17 NOTE — PROGRESS NOTES
Dominic Hawk is a 38 y.o. female    Chief Complaint   Patient presents with   • Annual Exam     Has not started the chantix, still has numbness on the bottom of her foot. Foot  did not find anything.    • Gynecologic Exam     Pap     History of Present Illness     Here for PE and pap    C/O intermittent palpitations.  They come and go.  Cannot pinpoint any true aggravating or relieving factors.  Sx's started about 5 years ago.  Happens spontaneously.  Sometimes associated with SOA.  No CP.      Continues to have left ankle, foot and toe pain - States that she hit her left pinky toe on a claw foot table about 3 months ago and has had pain since.  Also c/o left foot/toe numbness and tingling since the incident as well.  She did see a podiatrist since her last visit.  Work-up/Xrays were normal.  Burning, N/T has now moved to the bottom of the foot.  No h/o DM    Tdap - 2013  Flu shot - declines  Pneumovax - 7/2018  Pap - updating today    Diet - trying to make healthier choices; rarely eats out and does meal prep    Exercise - could be better, slowly trying to increase her activity    Social History   Substance Use Topics   • Smoking status: Current Every Day Smoker     Packs/day: 1.00     Years: 32.00     Types: Cigarettes   • Smokeless tobacco: Never Used   • Alcohol use Yes      Comment: 4-6 drinks per week         The following portions of the patient's history were reviewed and updated as appropriate: allergies, current medications, past family history, past medical history, past social history, past surgical history and problem list.    Current Outpatient Prescriptions:   •  ALPRAZolam (XANAX) 0.5 MG tablet, take 1/2 to 1 tablet by mouth every 6 to 8 hours if needed for anxiety, Disp: , Rfl: 0  •  CHANTIX STARTING MONTH CICI 0.5 MG X 11 & 1 MG X 42 tablet, , Disp: , Rfl: 0  •  escitalopram (LEXAPRO) 20 MG tablet, Take 20 mg by mouth Daily., Disp: , Rfl: 1  •  fluticasone (FLONASE) 50 MCG/ACT nasal  spray, instill 2 sprays into each nostril once daily, Disp: , Rfl: 1  •  nicotine (NICODERM CQ) 21 MG/24HR patch, Place 1 patch on the skin Daily., Disp: , Rfl:      Review of Systems   Constitutional: Negative for appetite change, chills, fatigue, fever and unexpected weight change.   HENT: Negative for congestion, ear pain, nosebleeds, rhinorrhea and tinnitus.    Eyes: Negative for pain.   Respiratory: Negative for cough, chest tightness and shortness of breath.    Cardiovascular: Positive for palpitations. Negative for chest pain and leg swelling.   Gastrointestinal: Negative for abdominal distention, abdominal pain, blood in stool, constipation, diarrhea, nausea and vomiting.   Endocrine: Negative for cold intolerance, heat intolerance, polydipsia, polyphagia and polyuria.   Genitourinary: Negative for dysuria, flank pain, frequency, hematuria and urgency.   Musculoskeletal: Negative for arthralgias, back pain, gait problem, joint swelling, myalgias and neck pain.        Left foot pain   Skin: Negative for color change, pallor, rash and wound.   Allergic/Immunologic: Negative for environmental allergies and food allergies.   Neurological: Negative for dizziness, syncope, weakness, light-headedness, numbness and headaches.   Hematological: Negative for adenopathy. Does not bruise/bleed easily.   Psychiatric/Behavioral: Negative for behavioral problems and suicidal ideas. The patient is not nervous/anxious.        Objective     Physical Exam   Constitutional: She is oriented to person, place, and time. She appears well-developed and well-nourished.   HENT:   Head: Normocephalic and atraumatic.   Right Ear: External ear normal.   Left Ear: External ear normal.   Nose: Nose normal.   Mouth/Throat: Oropharynx is clear and moist.   Eyes: Pupils are equal, round, and reactive to light. Conjunctivae and EOM are normal.   Neck: Normal range of motion. Neck supple.   Cardiovascular: Normal rate, regular rhythm, normal  "heart sounds and intact distal pulses.    Pulses:       Carotid pulses are 2+ on the right side, and 2+ on the left side.  Pulmonary/Chest: Effort normal and breath sounds normal. Right breast exhibits no inverted nipple, no mass, no nipple discharge, no skin change and no tenderness. Left breast exhibits no inverted nipple, no mass, no nipple discharge, no skin change and no tenderness. Breasts are symmetrical. There is no breast swelling.   Abdominal: Soft. Normal appearance, normal aorta and bowel sounds are normal.   Genitourinary: Vagina normal and uterus normal. No breast tenderness, discharge or bleeding.   Musculoskeletal: Normal range of motion.        Left foot: Normal.   Lymphadenopathy:        Head (right side): No tonsillar adenopathy present.        Head (left side): No tonsillar adenopathy present.        Right cervical: No superficial cervical and no posterior cervical adenopathy present.       Left cervical: No superficial cervical and no posterior cervical adenopathy present.   Neurological: She is alert and oriented to person, place, and time. She has normal strength and normal reflexes. She displays a negative Romberg sign.   Skin: Skin is warm, dry and intact.   Psychiatric: She has a normal mood and affect. Her behavior is normal. Judgment and thought content normal.   Vitals reviewed.    Vitals:    09/17/18 1320   BP: 130/78   Pulse: 92   Resp: 16   Temp: 97.7 °F (36.5 °C)   TempSrc: Temporal Artery    SpO2: 98%   Weight: 101 kg (222 lb 3.2 oz)   Height: 158.8 cm (62.52\")       ECG 12 Lead  Date/Time: 9/17/2018 10:55 AM  Performed by: MONIKA PEREYRA  Authorized by: MONIKA PEREYRA   Comparison: not compared with previous ECG   Previous ECG: no previous ECG available  Rhythm: sinus rhythm  Rate: normal  Conduction: conduction normal  ST Segments: ST segments normal  T Waves: T waves normal  QRS axis: normal  Other: no other findings  Clinical impression: normal " ECG            Assessment/Plan   Alicia was seen today for annual exam and gynecologic exam.    Diagnoses and all orders for this visit:    Annual physical exam  -     POC Urinalysis Dipstick, Automated  -     CBC & Differential; Future  -     Comprehensive Metabolic Panel; Future  -     Lipid Panel; Future  -     Vitamin D 25 Hydroxy; Future  -     Vitamin B12; Future  -     TSH; Future  -     Liquid-based Pap Smear, Screening; Future  -     Cancel: CBC & Differential  -     Cancel: Comprehensive Metabolic Panel  -     Cancel: Lipid Panel  -     Cancel: Vitamin D 25 Hydroxy  -     Cancel: Vitamin B12  -     Cancel: TSH  -     Cancel: CBC Auto Differential  -     CBC (No Diff); Future  -     Comprehensive Metabolic Panel; Future  -     Lipid Panel; Future  -     TSH; Future  -     Vitamin B12; Future  -     Vitamin D 25 Hydroxy; Future    Encounter for gynecological examination without abnormal finding  -     CBC & Differential; Future  -     Comprehensive Metabolic Panel; Future  -     Lipid Panel; Future  -     Vitamin D 25 Hydroxy; Future  -     Vitamin B12; Future  -     TSH; Future  -     Liquid-based Pap Smear, Screening; Future  -     Cancel: CBC & Differential  -     Cancel: Comprehensive Metabolic Panel  -     Cancel: Lipid Panel  -     Cancel: Vitamin D 25 Hydroxy  -     Cancel: Vitamin B12  -     Cancel: TSH  -     Cancel: CBC Auto Differential    Palpitations  -     ECG 12 Lead    Vaginal discharge  -     NuSwab VG+ - Swab, Vagina      Pt is not fasting so she will RTC for labs  EKG - WNL  Pap and Nuswab sent due to vaginal d/c  She declines flu shot  Breast exam done  Counseling - diet and exercise  She wishes continue to monitor foot pain for now and will let me know if not improving; may need EMG/NCS  Return in about 6 months (around 3/17/2019).

## 2018-09-21 LAB
A VAGINAE DNA VAG QL NAA+PROBE: NORMAL SCORE
BVAB2 DNA VAG QL NAA+PROBE: NORMAL SCORE
C ALBICANS DNA VAG QL NAA+PROBE: NEGATIVE
C GLABRATA DNA VAG QL NAA+PROBE: NEGATIVE
C TRACH RRNA SPEC DONR QL NAA+PROBE: NEGATIVE
MEGASPHAERA 1: NORMAL SCORE
N GONORRHOEA DNA SPEC QL NAA+PROBE: NEGATIVE
T VAGINALIS RRNA GENITAL QL PROBE: NEGATIVE

## 2018-09-24 ENCOUNTER — LAB (OUTPATIENT)
Dept: INTERNAL MEDICINE | Facility: CLINIC | Age: 38
End: 2018-09-24

## 2018-09-24 DIAGNOSIS — Z00.00 ANNUAL PHYSICAL EXAM: ICD-10-CM

## 2018-09-24 LAB
25(OH)D3 SERPL-MCNC: 39.5 NG/ML
ALBUMIN SERPL-MCNC: 4.48 G/DL (ref 3.2–4.8)
ALBUMIN/GLOB SERPL: 2.1 G/DL (ref 1.5–2.5)
ALP SERPL-CCNC: 51 U/L (ref 25–100)
ALT SERPL W P-5'-P-CCNC: 24 U/L (ref 7–40)
ANION GAP SERPL CALCULATED.3IONS-SCNC: 15 MMOL/L (ref 3–11)
ARTICHOKE IGE QN: 68 MG/DL (ref 0–130)
AST SERPL-CCNC: 19 U/L (ref 0–33)
BILIRUB SERPL-MCNC: 0.4 MG/DL (ref 0.3–1.2)
BUN BLD-MCNC: 8 MG/DL (ref 9–23)
BUN/CREAT SERPL: 12.3 (ref 7–25)
CALCIUM SPEC-SCNC: 9.3 MG/DL (ref 8.7–10.4)
CHLORIDE SERPL-SCNC: 104 MMOL/L (ref 99–109)
CHOLEST SERPL-MCNC: 155 MG/DL (ref 0–200)
CO2 SERPL-SCNC: 23 MMOL/L (ref 20–31)
CREAT BLD-MCNC: 0.65 MG/DL (ref 0.6–1.3)
DEPRECATED RDW RBC AUTO: 43.4 FL (ref 37–54)
ERYTHROCYTE [DISTWIDTH] IN BLOOD BY AUTOMATED COUNT: 12.5 % (ref 11.3–14.5)
GFR SERPL CREATININE-BSD FRML MDRD: 103 ML/MIN/1.73
GLOBULIN UR ELPH-MCNC: 2.1 GM/DL
GLUCOSE BLD-MCNC: 100 MG/DL (ref 70–100)
HCT VFR BLD AUTO: 44.7 % (ref 34.5–44)
HDLC SERPL-MCNC: 59 MG/DL (ref 40–60)
HGB BLD-MCNC: 15.3 G/DL (ref 11.5–15.5)
MCH RBC QN AUTO: 32.8 PG (ref 27–31)
MCHC RBC AUTO-ENTMCNC: 34.2 G/DL (ref 32–36)
MCV RBC AUTO: 95.7 FL (ref 80–99)
PLATELET # BLD AUTO: 289 10*3/MM3 (ref 150–450)
PMV BLD AUTO: 10.8 FL (ref 6–12)
POTASSIUM BLD-SCNC: 4.7 MMOL/L (ref 3.5–5.5)
PROT SERPL-MCNC: 6.6 G/DL (ref 5.7–8.2)
RBC # BLD AUTO: 4.67 10*6/MM3 (ref 3.89–5.14)
SODIUM BLD-SCNC: 142 MMOL/L (ref 132–146)
TRIGL SERPL-MCNC: 292 MG/DL (ref 0–150)
TSH SERPL DL<=0.05 MIU/L-ACNC: 1.09 MIU/ML (ref 0.35–5.35)
VIT B12 BLD-MCNC: 574 PG/ML (ref 211–911)
WBC NRBC COR # BLD: 10.44 10*3/MM3 (ref 3.5–10.8)

## 2018-09-24 PROCEDURE — 36415 COLL VENOUS BLD VENIPUNCTURE: CPT

## 2018-09-24 PROCEDURE — 80050 GENERAL HEALTH PANEL: CPT | Performed by: NURSE PRACTITIONER

## 2018-09-24 PROCEDURE — 82607 VITAMIN B-12: CPT | Performed by: NURSE PRACTITIONER

## 2018-09-24 PROCEDURE — 80061 LIPID PANEL: CPT | Performed by: NURSE PRACTITIONER

## 2018-09-24 PROCEDURE — 82306 VITAMIN D 25 HYDROXY: CPT | Performed by: NURSE PRACTITIONER

## 2018-12-28 ENCOUNTER — OFFICE VISIT (OUTPATIENT)
Dept: RETAIL CLINIC | Facility: CLINIC | Age: 38
End: 2018-12-28

## 2018-12-28 VITALS
RESPIRATION RATE: 20 BRPM | TEMPERATURE: 98.2 F | DIASTOLIC BLOOD PRESSURE: 60 MMHG | HEIGHT: 62 IN | SYSTOLIC BLOOD PRESSURE: 104 MMHG | HEART RATE: 95 BPM | BODY MASS INDEX: 40.67 KG/M2 | WEIGHT: 221 LBS | OXYGEN SATURATION: 98 %

## 2018-12-28 DIAGNOSIS — J02.9 SORE THROAT: ICD-10-CM

## 2018-12-28 DIAGNOSIS — J01.40 ACUTE NON-RECURRENT PANSINUSITIS: Primary | ICD-10-CM

## 2018-12-28 LAB
EXPIRATION DATE: NORMAL
INTERNAL CONTROL: NORMAL
Lab: NORMAL
S PYO AG THROAT QL: NEGATIVE

## 2018-12-28 PROCEDURE — 99213 OFFICE O/P EST LOW 20 MIN: CPT | Performed by: NURSE PRACTITIONER

## 2018-12-28 PROCEDURE — 87880 STREP A ASSAY W/OPTIC: CPT | Performed by: NURSE PRACTITIONER

## 2018-12-28 RX ORDER — DEXTROMETHORPHAN HYDROBROMIDE AND PROMETHAZINE HYDROCHLORIDE 15; 6.25 MG/5ML; MG/5ML
5 SYRUP ORAL 4 TIMES DAILY PRN
Qty: 240 ML | Refills: 0 | Status: SHIPPED | OUTPATIENT
Start: 2018-12-28 | End: 2019-01-07

## 2018-12-28 RX ORDER — GUAIFENESIN 600 MG/1
1200 TABLET, EXTENDED RELEASE ORAL 2 TIMES DAILY
Qty: 40 TABLET | Refills: 0 | Status: SHIPPED | OUTPATIENT
Start: 2018-12-28 | End: 2019-01-07

## 2018-12-28 RX ORDER — AZITHROMYCIN 250 MG/1
TABLET, FILM COATED ORAL
Qty: 6 TABLET | Refills: 0 | OUTPATIENT
Start: 2018-12-28 | End: 2019-07-18 | Stop reason: HOSPADM

## 2019-01-10 ENCOUNTER — CLINICAL SUPPORT (OUTPATIENT)
Dept: INTERNAL MEDICINE | Facility: CLINIC | Age: 39
End: 2019-01-10

## 2019-01-10 DIAGNOSIS — Z23 NEED FOR VACCINATION: Primary | ICD-10-CM

## 2019-01-10 PROCEDURE — 90632 HEPA VACCINE ADULT IM: CPT | Performed by: NURSE PRACTITIONER

## 2019-01-10 PROCEDURE — 90471 IMMUNIZATION ADMIN: CPT | Performed by: NURSE PRACTITIONER

## 2025-04-18 ENCOUNTER — TELEPHONE (OUTPATIENT)
Dept: URGENT CARE | Facility: CLINIC | Age: 45
End: 2025-04-18
Payer: COMMERCIAL

## 2025-04-18 NOTE — TELEPHONE ENCOUNTER
Pharmacy staff questions with Azithromycin, patient calling for her medication, it's not been sent to the Pharmacy to be fill, & she called again this morning to follow up her medication.     Patient already picked up her medication. Patient confirmed.